# Patient Record
Sex: FEMALE | Race: NATIVE HAWAIIAN OR OTHER PACIFIC ISLANDER
[De-identification: names, ages, dates, MRNs, and addresses within clinical notes are randomized per-mention and may not be internally consistent; named-entity substitution may affect disease eponyms.]

---

## 2017-03-08 ENCOUNTER — HOSPITAL ENCOUNTER (OUTPATIENT)
Dept: HOSPITAL 47 - RADXRMAIN | Age: 37
Discharge: HOME | End: 2017-03-08
Attending: FAMILY MEDICINE
Payer: COMMERCIAL

## 2017-03-08 DIAGNOSIS — M54.2: Primary | ICD-10-CM

## 2017-03-08 PROCEDURE — 72050 X-RAY EXAM NECK SPINE 4/5VWS: CPT

## 2017-03-08 NOTE — XR
EXAMINATION TYPE: XR cervical spine comp

 

DATE OF EXAM: 3/8/2017 2:40 PM

 

COMPARISON: NONE

 

HISTORY: Cervicalgia

 

TECHNIQUE: 5 view cervical spine

 

FINDINGS: No acute fracture evident. Alignment is normal. Prevertebral space is normal. Posterior spi
nal lamellar line is intact. Disc height and vertebral body heights are preserved. Foramen are patent
. Prominent C7 transverse processes are present.

 

IMPRESSION:

1.  No acute osseous abnormality cervical spine

## 2017-03-10 ENCOUNTER — HOSPITAL ENCOUNTER (OUTPATIENT)
Dept: HOSPITAL 47 - LABWHC1 | Age: 37
Discharge: HOME | End: 2017-03-10
Payer: COMMERCIAL

## 2017-03-10 DIAGNOSIS — E03.9: ICD-10-CM

## 2017-03-10 DIAGNOSIS — D35.2: Primary | ICD-10-CM

## 2017-03-10 PROCEDURE — 36415 COLL VENOUS BLD VENIPUNCTURE: CPT

## 2017-03-10 PROCEDURE — 84146 ASSAY OF PROLACTIN: CPT

## 2017-03-10 PROCEDURE — 84443 ASSAY THYROID STIM HORMONE: CPT

## 2018-02-19 ENCOUNTER — HOSPITAL ENCOUNTER (OUTPATIENT)
Dept: HOSPITAL 47 - RADUSWWP | Age: 38
Discharge: HOME | End: 2018-02-19
Payer: COMMERCIAL

## 2018-02-19 DIAGNOSIS — N88.8: Primary | ICD-10-CM

## 2018-02-19 DIAGNOSIS — N92.1: ICD-10-CM

## 2018-02-19 PROCEDURE — 76830 TRANSVAGINAL US NON-OB: CPT

## 2018-02-19 PROCEDURE — 76856 US EXAM PELVIC COMPLETE: CPT

## 2018-02-19 NOTE — US
EXAMINATION TYPE: US pelvis complete transvag

 

DATE OF EXAM: 2/19/2018

 

COMPARISON: 12/2/2016

 

CLINICAL HISTORY: 37-year-old female Pelvic R10.2, N92.1 Frequent menstruation.

 

TECHNIQUE:  Transvaginal (TV) and Transabdominal (TA)  

Transvaginal scanning was medically necessary to visualize the left ovary.

 

Date of LMP:  1/26/18

 

Findings:

 

Uterus:  Anteverted measuring 7.4 x 3.7 x 4.8 cm. Numerous cervical nabothian cysts are present.

 

Endometrial Stripe: 1.1 cm, slightly heterogeneous.

 

Right Ovary:  2.6 x 1.4 x 2.9 cm with follicular change.

 

Left Ovary: not visualized on either transabdominal or transvaginal scanning due to adjacent bowel ga
s.

 

No evident adnexal abnormality or cul-de-sac free fluid.

 

 

 

IMPRESSION: 

1. Numerous cervical nabothian cysts incidentally noted.

2. Endometrial stripe measuring 1.1 cm thick should correspond to the secretory phase of the menstrua
l cycle.

3. Left ovary could not be visualized by either transabdominal or transvaginal scanning.

## 2019-01-21 ENCOUNTER — HOSPITAL ENCOUNTER (OUTPATIENT)
Dept: HOSPITAL 47 - ORWHC2ENDO | Age: 39
Discharge: HOME | End: 2019-01-21
Payer: COMMERCIAL

## 2019-01-21 VITALS — HEART RATE: 69 BPM | DIASTOLIC BLOOD PRESSURE: 69 MMHG | RESPIRATION RATE: 16 BRPM | SYSTOLIC BLOOD PRESSURE: 121 MMHG

## 2019-01-21 VITALS — TEMPERATURE: 97.6 F

## 2019-01-21 VITALS — BODY MASS INDEX: 28.3 KG/M2

## 2019-01-21 DIAGNOSIS — K21.9: ICD-10-CM

## 2019-01-21 DIAGNOSIS — K44.9: ICD-10-CM

## 2019-01-21 DIAGNOSIS — R13.10: ICD-10-CM

## 2019-01-21 DIAGNOSIS — Z79.890: ICD-10-CM

## 2019-01-21 DIAGNOSIS — K29.50: Primary | ICD-10-CM

## 2019-01-21 DIAGNOSIS — E07.9: ICD-10-CM

## 2019-01-21 DIAGNOSIS — Z91.041: ICD-10-CM

## 2019-01-21 DIAGNOSIS — Z79.899: ICD-10-CM

## 2019-01-21 DIAGNOSIS — Z79.84: ICD-10-CM

## 2019-01-21 LAB — GLUCOSE BLD-MCNC: 81 MG/DL (ref 75–99)

## 2019-01-21 PROCEDURE — 43239 EGD BIOPSY SINGLE/MULTIPLE: CPT

## 2019-01-21 PROCEDURE — 88305 TISSUE EXAM BY PATHOLOGIST: CPT

## 2019-01-21 NOTE — P.PCN
Date of Procedure: 01/21/19


Procedure(s) Performed: 


Procedure: Esophagogastroduodenoscopy and biopsy.





Preoperative diagnosis: Epigastric pain and dysphagia.





Postoperative diagnosis: 1. Small hiatal hernia with no obvious esophagitis or 

complicated reflux disease.  2. Mild antral gastritis.  3. Biopsies obtained 

from the duodenum, antrum and esophagus were





Preparation and sedation: Was provided by anesthesia.





Brief clinical history: The patient is a 38-year-old female who was evaluated 

last month in the office for complaints of sharp epigastric pain and trouble 

swallowing.  Apparently, this has come and gone over the years.  She had hiatal 

hernia surgery in the past and did feel better for couple years but now her 

pains has returned.  She had the gallbladder removed as well as the past.  Her 

last EGD was around 4 years ago.





Procedure: With the patient on her left lateral decubitus position and after 

informed consent and adequate sedation, I passed the Olympus-GIF H1 90 video 

upper endoscope through the cricopharyngeus down the esophagus.  GE junction 

was around 31 cm from the incisors and there was a small sliding hiatal hernia 

with no obvious esophagitis or complicated reflux disease.  The endoscope was 

then passed into the stomach which was insufflated with air and inspected in 

detail including the retroflex view in the cardia.  There was evidence of prior 

fundoplication.  There was also mottling and erythema in the antrum but no 

ulcers.  Pyloric channel did not show any ulcers.  Duodenal bulb, post bulbar 

area and descending duodenum appeared within normal limits.  I obtained 

biopsies from the duodenum, antrum and esophagus then the endoscope was 

withdrawn.





The patient tolerated the procedure well.





Plan: The patient was reassured.  Will await biopsy results and make further 

plans based on her course and biopsy results.  She will follow-up with you as 

planned and I will be happy to see in the office if her symptoms persist or 

recur.

## 2019-04-03 ENCOUNTER — HOSPITAL ENCOUNTER (OUTPATIENT)
Dept: HOSPITAL 47 - RADUSWWP | Age: 39
Discharge: HOME | End: 2019-04-03
Attending: FAMILY MEDICINE
Payer: COMMERCIAL

## 2019-04-03 DIAGNOSIS — R10.2: ICD-10-CM

## 2019-04-03 DIAGNOSIS — R93.89: Primary | ICD-10-CM

## 2019-04-03 PROCEDURE — 76801 OB US < 14 WKS SINGLE FETUS: CPT

## 2019-04-03 PROCEDURE — 76817 TRANSVAGINAL US OBSTETRIC: CPT

## 2019-04-03 NOTE — US
EXAMINATION TYPE: Transabdominal

 

DATE OF EXAM: 4/3/2019 10:56 AM

 

COMPARISON: NONE

 

CLINICAL HISTORY: R10.2 PELVIC AND PERINEAL PAIN. Early OB, fertility issues, A1

 

EXAM PERFORMED:  OBTA, OBTV

 

EXAM MEASUREMENTS:

 

GESTATIONAL AGE / DATING

 

Physician Established: Not yet established 

Dates by LMP:  LMP unknown 

Dates by First Scan:  No previous this is first scan 

Dates by Current Scan for: No IUP seen at this time   

 

 

MATERNAL ANATOMY 

 

Uterus: 9.3 x 6.3 x 5.4cm

Right Ovary: 2.3 x 1.9 x 1.7cm

Left Ovary: 3.8 x 3.5 x 3.1cm

Post CDS / Adnexa: wnl

Presence of free fluid: no

Presence of corpus luteal cyst: possible on the left = 3.1c,

Presence of subchorionic bleed: no

 

GESTATION / FETAL SURVEY

 

 

IUP:  No IUP seen at this time

Endometrium = 2.7cm , thickened with an irregular, mixed lesion, noted centrally,  measuring 1.1cm, u
nsure if abnormal gestational sac versus other etiology

 

Date of LMP: unknown

Beta HcG (if available):  not available

 

 

 

 

 

IMPRESSION:

Endometrium is markedly thickened at 2.7 cm appears somewhat irregular with a mixed lesion noted cent
rally measuring 1.1 cm. This is of uncertain etiology. Missed , abnormal gestational sac, or 
normal pregnancy too early to detect in the differential diagnosis. Ectopic pregnancy not excluded. R
ecommend follow-up serial beta hCG and pelvic ultrasound as clinically warranted.

## 2019-04-10 ENCOUNTER — HOSPITAL ENCOUNTER (OUTPATIENT)
Dept: HOSPITAL 47 - RADUSWWP | Age: 39
Discharge: HOME | End: 2019-04-10
Attending: FAMILY MEDICINE
Payer: COMMERCIAL

## 2019-04-10 DIAGNOSIS — Z3A.00: ICD-10-CM

## 2019-04-10 DIAGNOSIS — R10.2: ICD-10-CM

## 2019-04-10 DIAGNOSIS — O99.89: Primary | ICD-10-CM

## 2019-04-10 PROCEDURE — 76801 OB US < 14 WKS SINGLE FETUS: CPT

## 2019-04-10 PROCEDURE — 76817 TRANSVAGINAL US OBSTETRIC: CPT

## 2019-04-10 NOTE — US
EXAMINATION TYPE: Transabdominal

 

DATE OF EXAM: 4/10/2019 1:34 PM

 

COMPARISON: US 4/3/19.

 

CLINICAL HISTORY: Z33.1 Pregnant state, incidental,R10.2 PELVIC PAIN. Pelvic pain x 1 month.  Hx ovar
diya cyst.  Patient unsure of LMP.

 

EXAM PERFORMED:  Transvaginal (TV) and Transabdominal (TA)

 

EXAM MEASUREMENTS:

 

GESTATIONAL AGE / DATING

 

Physician Established: Not yet established. 

Dates by LMP:  Pt unsure of LMP 

Dates by First Scan:  No IUP seen 4/3/19. 

 

 

MATERNAL ANATOMY 

 

Uterus: 9.8 x 7.2 x 5.9 cm. Hypoechoic area seen: 0.6 x 0.7 x 0.5 cm.

Right Ovary: 3.4 x 1.8 x 1.4 cm

Left Ovary: 3.9 x 2.3 x 2.9 cm

Post CDS / Adnexa: wnl

Presence of free fluid: No

Presence of corpus luteal cyst: left ovary area of mixed echogenicity and peripheral vascularity seen
: 3.2 x 1.7 x 2.6 cm.  

Presence of subchorionic bleed: No evidence.

 

GESTATION / FETAL SURVEY

 

CRL: Not seen. 

MSD: 0.94 cm.  Too early to calculate dates. 

Yolk Sac (normal less than 6mm): 0.19

IUP:  No fetal pole seen at this time

 

Date of LMP: Unknown

 

 

IMPRESSION: Small intramural endometrial fluid collection containing a yolk sac that may represent ea
rly intrauterine pregnancy or anembryonic pregnancy/blighted ovum. Follow-up pelvic ultrasound in 7-1
0 days is recommended as well as repeat short-term serial serum beta hCG.

## 2019-04-23 ENCOUNTER — HOSPITAL ENCOUNTER (OUTPATIENT)
Dept: HOSPITAL 47 - RADUSWWP | Age: 39
Discharge: HOME | End: 2019-04-23
Attending: FAMILY MEDICINE
Payer: COMMERCIAL

## 2019-04-23 DIAGNOSIS — Z3A.01: ICD-10-CM

## 2019-04-23 DIAGNOSIS — Z34.91: Primary | ICD-10-CM

## 2019-04-23 PROCEDURE — 76801 OB US < 14 WKS SINGLE FETUS: CPT

## 2019-04-23 NOTE — US
EXAMINATION TYPE: Transabdominal

 

DATE OF EXAM: 4/23/2019 2:00 PM

 

COMPARISON: US 2019

 

CLINICAL HISTORY: Z33.1 Pregnant stated, incidental. Pelvic pain

 

EXAM PERFORMED:  Transabdominal (TA)

 

EXAM MEASUREMENTS:

 

GESTATIONAL AGE / DATING

 

Physician Established: Not established yet 

Dates by LMP:  Patient unsure 

Dates by First Scan:  No IUP seen on 1st scan 

Dates by Current Scan for: (7 weeks/1 days)  

** EDC: 12/09/2019

 

MATERNAL ANATOMY 

 

Uterus: 10.4 x 6.5 x 8.0cm, anteverted

Right Ovary: 2.8 x 1.4 x 1.8cm

Left Ovary: 4.0 x 2.1 x 2.8cm

Post CDS / Adnexa: wnl

Presence of free fluid: no

Presence of corpus luteal cyst: left ovary: 2.4 x 1.6 x 1.9cm 

Presence of subchorionic bleed: no

 

GESTATION / FETAL SURVEY

 

CRL: 10.3cm (7 weeks/1 days)

Yolk Sac (normal less than 6mm): 4.3mm

Heart Rate: 149 bpm

Rhythm:  Normal

IUP:  Viable IUP

 

Date of LMP: Patient unsure

Beta HcG (if available):  Not available at time of exam

 

 

 

 

 

IMPRESSION:

Viable single IUP measuring 7 weeks 1 day with a heart rate of 149bpm and estimated delivery date of 
12/09/2019.

## 2020-04-26 ENCOUNTER — HOSPITAL ENCOUNTER (EMERGENCY)
Dept: HOSPITAL 47 - EC | Age: 40
LOS: 1 days | Discharge: HOME | End: 2020-04-27
Payer: COMMERCIAL

## 2020-04-26 DIAGNOSIS — Z79.84: ICD-10-CM

## 2020-04-26 DIAGNOSIS — Z79.890: ICD-10-CM

## 2020-04-26 DIAGNOSIS — Z79.899: ICD-10-CM

## 2020-04-26 DIAGNOSIS — N83.202: ICD-10-CM

## 2020-04-26 DIAGNOSIS — Z90.49: ICD-10-CM

## 2020-04-26 DIAGNOSIS — E07.9: ICD-10-CM

## 2020-04-26 DIAGNOSIS — Z88.8: ICD-10-CM

## 2020-04-26 DIAGNOSIS — N12: Primary | ICD-10-CM

## 2020-04-26 LAB
ALBUMIN SERPL-MCNC: 4.2 G/DL (ref 3.5–5)
ALP SERPL-CCNC: 81 U/L (ref 38–126)
ALT SERPL-CCNC: 53 U/L (ref 4–34)
AMYLASE SERPL-CCNC: 33 U/L (ref 30–110)
ANION GAP SERPL CALC-SCNC: 9 MMOL/L
AST SERPL-CCNC: 39 U/L (ref 14–36)
BASOPHILS # BLD AUTO: 0.1 K/UL (ref 0–0.2)
BASOPHILS NFR BLD AUTO: 0 %
BUN SERPL-SCNC: 8 MG/DL (ref 7–17)
CALCIUM SPEC-MCNC: 9.4 MG/DL (ref 8.4–10.2)
CHLORIDE SERPL-SCNC: 104 MMOL/L (ref 98–107)
CO2 SERPL-SCNC: 21 MMOL/L (ref 22–30)
EOSINOPHIL # BLD AUTO: 0.2 K/UL (ref 0–0.7)
EOSINOPHIL NFR BLD AUTO: 1 %
ERYTHROCYTE [DISTWIDTH] IN BLOOD BY AUTOMATED COUNT: 5.2 M/UL (ref 3.8–5.4)
ERYTHROCYTE [DISTWIDTH] IN BLOOD: 13.1 % (ref 11.5–15.5)
GLUCOSE SERPL-MCNC: 148 MG/DL (ref 74–99)
HCT VFR BLD AUTO: 48.4 % (ref 34–46)
HGB BLD-MCNC: 16.2 GM/DL (ref 11.4–16)
LYMPHOCYTES # SPEC AUTO: 1.5 K/UL (ref 1–4.8)
LYMPHOCYTES NFR SPEC AUTO: 10 %
MCH RBC QN AUTO: 31.2 PG (ref 25–35)
MCHC RBC AUTO-ENTMCNC: 33.5 G/DL (ref 31–37)
MCV RBC AUTO: 93 FL (ref 80–100)
MONOCYTES # BLD AUTO: 0.8 K/UL (ref 0–1)
MONOCYTES NFR BLD AUTO: 5 %
NEUTROPHILS # BLD AUTO: 12.9 K/UL (ref 1.3–7.7)
NEUTROPHILS NFR BLD AUTO: 82 %
PH UR: 6 [PH] (ref 5–8)
PLATELET # BLD AUTO: 201 K/UL (ref 150–450)
POTASSIUM SERPL-SCNC: 3.9 MMOL/L (ref 3.5–5.1)
PROT SERPL-MCNC: 7.4 G/DL (ref 6.3–8.2)
RBC UR QL: 2 /HPF (ref 0–5)
SODIUM SERPL-SCNC: 134 MMOL/L (ref 137–145)
SP GR UR: 1 (ref 1–1.03)
SQUAMOUS UR QL AUTO: 4 /HPF (ref 0–4)
UROBILINOGEN UR QL STRIP: <2 MG/DL (ref ?–2)
WBC # BLD AUTO: 15.8 K/UL (ref 3.8–10.6)
WBC # UR AUTO: 7 /HPF (ref 0–5)

## 2020-04-26 PROCEDURE — 96375 TX/PRO/DX INJ NEW DRUG ADDON: CPT

## 2020-04-26 PROCEDURE — 93975 VASCULAR STUDY: CPT

## 2020-04-26 PROCEDURE — 87635 SARS-COV-2 COVID-19 AMP PRB: CPT

## 2020-04-26 PROCEDURE — 99284 EMERGENCY DEPT VISIT MOD MDM: CPT

## 2020-04-26 PROCEDURE — 87040 BLOOD CULTURE FOR BACTERIA: CPT

## 2020-04-26 PROCEDURE — 96376 TX/PRO/DX INJ SAME DRUG ADON: CPT

## 2020-04-26 PROCEDURE — 76830 TRANSVAGINAL US NON-OB: CPT

## 2020-04-26 PROCEDURE — 96374 THER/PROPH/DIAG INJ IV PUSH: CPT

## 2020-04-26 PROCEDURE — 87086 URINE CULTURE/COLONY COUNT: CPT

## 2020-04-26 PROCEDURE — 80053 COMPREHEN METABOLIC PANEL: CPT

## 2020-04-26 PROCEDURE — 83690 ASSAY OF LIPASE: CPT

## 2020-04-26 PROCEDURE — 36415 COLL VENOUS BLD VENIPUNCTURE: CPT

## 2020-04-26 PROCEDURE — 74177 CT ABD & PELVIS W/CONTRAST: CPT

## 2020-04-26 PROCEDURE — 81025 URINE PREGNANCY TEST: CPT

## 2020-04-26 PROCEDURE — 83605 ASSAY OF LACTIC ACID: CPT

## 2020-04-26 PROCEDURE — 85025 COMPLETE CBC W/AUTO DIFF WBC: CPT

## 2020-04-26 PROCEDURE — 82150 ASSAY OF AMYLASE: CPT

## 2020-04-26 PROCEDURE — 81001 URINALYSIS AUTO W/SCOPE: CPT

## 2020-04-26 PROCEDURE — 71045 X-RAY EXAM CHEST 1 VIEW: CPT

## 2020-04-26 NOTE — XR
EXAMINATION TYPE: XR chest 1V portable

 

DATE OF EXAM: 4/26/2020

 

COMPARISON: None

 

INDICATION: Cough headache fever

 

TECHNIQUE: Single frontal view of the chest is obtained.

 

FINDINGS:  

The heart size is normal.  

The pulmonary vasculature is normal.  

The lungs are clear.  

No suspicious mild infiltrates are evident.

 

IMPRESSION:  

1. No acute pulmonary process.

## 2020-04-26 NOTE — ED
General Adult HPI





- General


Source: patient, family, RN notes reviewed


Mode of arrival: ambulatory


Limitations: language barrier





<Santos Alegria - Last Filed: 04/27/20 00:26>





<Dayan Connelly - Last Filed: 04/27/20 03:16>





- General


Chief complaint: Abdominal Pain


Stated complaint: Fever, headache, nausea


Time Seen by Provider: 04/26/20 19:07





- History of Present Illness


Initial comments: 





40-year-old female with a past medical history of GERD, migraines, thyroid 

disorder presents to the emergency department for several complaints.  Patient 

has her  translating for her.  Patient states about a week ago has 

started to have right flank pain radiating to the right abdomen with associated 

dysuria.  States that she thinks she could've a urinary tract infection.  

Patient has had these before.  Patient developed a fever last night according to

the  and she had a 102 fever today.  She has not received any Tylenol or 

Motrin today.  Patient is also complaining of headache associated with this.  

She denies cough or congestion.  Denies sore throat. Patient has no other 

complaints at this time including shortness of breath, chest pain,  headache, or

visual changes. (Santos Alegria)





- Related Data


                                Home Medications











 Medication  Instructions  Recorded  Confirmed


 


Levothyroxine Sodium [Synthroid] 75 mcg PO DAILY 01/03/19 01/18/19


 


Topiramate [Topiramate ER] 50 mg PO DAILY 01/03/19 01/18/19


 


metFORMIN HCL [Glucophage] 500 mg PO DAILY 01/03/19 01/18/19








                                  Previous Rx's











 Medication  Instructions  Recorded


 


Nitrofurantoin Monohyd/M-Cryst 100 mg PO Q12HR #14 cap 03/30/19





[Macrobid]  


 


Sulfamethox-Tmp 800-160Mg [Bactrim 1 tab PO Q12HR #28 tab 04/27/20





-160 mg]  











                                    Allergies











Allergy/AdvReac Type Severity Reaction Status Date / Time


 


gadobenate dimeglumine Allergy  Rash/Hives Verified 04/26/20 18:58





[From [facility]]     














Review of Systems


ROS Other: All systems not noted in ROS Statement are negative.





<Santos Alegria - Last Filed: 04/27/20 00:26>


ROS Other: All systems not noted in ROS Statement are negative.





<Dayan Connelly BETY - Last Filed: 04/27/20 03:16>


ROS Statement: 


Those systems with pertinent positive or pertinent negative responses have been 

documented in the HPI.








Past Medical History


Past Medical History: GERD/Reflux, Thyroid Disorder


Additional Past Medical History / Comment(s): takes metformin for PCOS, not 

diabetes, hx of migraines


History of Any Multi-Drug Resistant Organisms: ESBL


Date of last positivie culture/infection: 4/9/18


MDRO Source:: ESBL URINE


Past Surgical History: Cholecystectomy, Hernia Repair


Past Anesthesia/Blood Transfusion Reactions: Postoperative Nausea & Vomiting 

(PONV)


Past Psychological History: No Psychological Hx Reported


Smoking Status: Never smoker


Past Alcohol Use History: None Reported


Past Drug Use History: None Reported





- Past Family History


  ** Mother


Family Medical History: No Reported History





<Santos Alegria - Last Filed: 04/27/20 00:26>





General Exam


Limitations: language barrier


General appearance: alert, in no apparent distress


Head exam: Present: atraumatic, normocephalic, normal inspection


Eye exam: Present: normal appearance, PERRL, EOMI.  Absent: scleral icterus, 

conjunctival injection, periorbital swelling


ENT exam: Present: normal exam, mucous membranes moist


Neck exam: Present: normal inspection, full ROM.  Absent: tenderness, me

ningismus, lymphadenopathy


Respiratory exam: Present: normal lung sounds bilaterally.  Absent: respiratory 

distress, wheezes, rales, rhonchi, stridor


Cardiovascular Exam: Present: regular rate, normal rhythm, normal heart sounds. 

Absent: systolic murmur, diastolic murmur, rubs, gallop, clicks


GI/Abdominal exam: Present: soft, tenderness (RLQ tenderness), normal bowel 

sounds.  Absent: distended, guarding, rebound, rigid


Back exam: Present: CVA tenderness (R).  Absent: CVA tenderness (L)


Neurological exam: Present: alert





<Santos Alegria P - Last Filed: 04/27/20 00:26>





Course





<Santos Alegria - Last Filed: 04/27/20 00:26>





                                   Vital Signs











  04/26/20 04/26/20 04/26/20





  19:00 20:37 22:11


 


Temperature 98.4 F 98.8 F 100.4 F H


 


Pulse Rate 99 88 96


 


Respiratory 18 20 19





Rate   


 


Blood Pressure 115/71 102/69 106/80


 


O2 Sat by Pulse 98 100 96





Oximetry   














  04/26/20 04/27/20





  23:23 00:35


 


Temperature 98.6 F 98.1 F


 


Pulse Rate 86 71


 


Respiratory 16 18





Rate  


 


Blood Pressure 103/72 103/74


 


O2 Sat by Pulse 97 96





Oximetry  














- Reevaluation(s)


Reevaluation #1: 





04/26/20 22:17


Patient was reevaluated after CAT scan results and is still having considerable 

abdominal pain although it has improved.  She was given Tylenol another dose of 

pain medication.  I did recommend pelvic exam but  states that she was 

unable to tolerate this last time she was supposed to have one and he refuses to

have this done again.  Patient also refuses through his translation.  I did 

order a pelvic ultrasound given 4 cm cyst on the left ovary. (Santos Alegria)





Medical Decision Making





- Lab Data


Result diagrams: 


                                 04/26/20 19:42





                                 04/26/20 19:42





<Santos Alegria - Last Filed: 04/27/20 00:26>





- Lab Data


Result diagrams: 


                                 04/26/20 19:42





                                 04/26/20 19:42





<Dayan Connelly - Last Filed: 04/27/20 03:16>





- Medical Decision Making





Vitals are stable upon arrival.  Patient is afebrile.  She'll exam does reveal 

some right lower quadrant tenderness with right CVA tenderness.  CBC does show a

leukocytosis with a left shift.  CMP generally unremarkable.  Urinalysis does 

show many bacteria with 7 white blood cells and small leukocyte esterase.  

Coronavirus negative.  Chest x-ray shows no acute.  CT abdomen and pelvis was 

obtained.  There was a normal appendix however there is a 4.0 cm left ovarian 

cyst.  Given patient's pain ultrasound was performed today for torsion.  There 

is no evidence of torsion.  There is a large cyst on the ovary that could become

too beating to patient's pain.  Patient was given analgesics.  She did have some

moderate improvement in pain.  I discussed with the patient and  may have

a pyelonephritis given clinical symptoms and many bacteria.  She was given 

Rocephin.  I reviewed patient's previous urine cultures and she does have 

history of ESBL urine with sensitivity to Bactrim in the past.  I also discussed

inpatient versus outpatient management with patient and .  They prefer to

be discharged home and to follow-up with primary care.  However they are 

agreeable to returning here if she has any worsening symptoms. (Santos Alegria)


I was available for consultation in the emergency department.  The history and 

physical exam were done by the midlevel provider. I was consulted for this 

patients care. I reviewed the case with the midlevel provider and based on 

their presentation of the patient, I agree with the assessment, medical decision

making and plan of care as documented.





Chart was dictated using Dragon dictation software.  Attempts were made to 

correct any dictation errors however some typographical errors may persist.





Patient was seen during the national state of emergency due to the covid-19 

pandemic.  (Dayan Connelly)





- Lab Data





                                   Lab Results











  04/26/20 04/26/20 04/26/20 Range/Units





  19:14 19:42 19:42 


 


WBC   15.8 H   (3.8-10.6)  k/uL


 


RBC   5.20   (3.80-5.40)  m/uL


 


Hgb   16.2 H   (11.4-16.0)  gm/dL


 


Hct   48.4 H   (34.0-46.0)  %


 


MCV   93.0   (80.0-100.0)  fL


 


MCH   31.2   (25.0-35.0)  pg


 


MCHC   33.5   (31.0-37.0)  g/dL


 


RDW   13.1   (11.5-15.5)  %


 


Plt Count   201   (150-450)  k/uL


 


Neutrophils %   82   %


 


Lymphocytes %   10   %


 


Monocytes %   5   %


 


Eosinophils %   1   %


 


Basophils %   0   %


 


Neutrophils #   12.9 H   (1.3-7.7)  k/uL


 


Lymphocytes #   1.5   (1.0-4.8)  k/uL


 


Monocytes #   0.8   (0-1.0)  k/uL


 


Eosinophils #   0.2   (0-0.7)  k/uL


 


Basophils #   0.1   (0-0.2)  k/uL


 


Sodium     (137-145)  mmol/L


 


Potassium     (3.5-5.1)  mmol/L


 


Chloride     ()  mmol/L


 


Carbon Dioxide     (22-30)  mmol/L


 


Anion Gap     mmol/L


 


BUN     (7-17)  mg/dL


 


Creatinine     (0.52-1.04)  mg/dL


 


Est GFR (CKD-EPI)AfAm     (>60 ml/min/1.73 sqM)  


 


Est GFR (CKD-EPI)NonAf     (>60 ml/min/1.73 sqM)  


 


Glucose     (74-99)  mg/dL


 


Plasma Lactic Acid Sawyer     (0.7-2.0)  mmol/L


 


Calcium     (8.4-10.2)  mg/dL


 


Total Bilirubin     (0.2-1.3)  mg/dL


 


AST     (14-36)  U/L


 


ALT     (4-34)  U/L


 


Alkaline Phosphatase     ()  U/L


 


Total Protein     (6.3-8.2)  g/dL


 


Albumin     (3.5-5.0)  g/dL


 


Amylase     ()  U/L


 


Lipase     ()  U/L


 


Urine Color    Light Yellow  


 


Urine Appearance    Clear  (Clear)  


 


Urine pH    6.0  (5.0-8.0)  


 


Ur Specific Gravity    1.004  (1.001-1.035)  


 


Urine Protein    Negative  (Negative)  


 


Urine Glucose (UA)    Negative  (Negative)  


 


Urine Ketones    Negative  (Negative)  


 


Urine Blood    Moderate H  (Negative)  


 


Urine Nitrite    Negative  (Negative)  


 


Urine Bilirubin    Negative  (Negative)  


 


Urine Urobilinogen    <2.0  (<2.0)  mg/dL


 


Ur Leukocyte Esterase    Small H  (Negative)  


 


Urine RBC    2  (0-5)  /hpf


 


Urine WBC    7 H  (0-5)  /hpf


 


Ur Squamous Epith Cells    4  (0-4)  /hpf


 


Amorphous Sediment    Rare H  (None)  /hpf


 


Urine Bacteria    Many H  (None)  /hpf


 


Urine HCG, Qual  Not Detected    (Not Detectd)  


 


Coronavirus (PCR)     (Not Detectd)  














  04/26/20 04/26/20 04/26/20 Range/Units





  19:42 19:42 19:42 


 


WBC     (3.8-10.6)  k/uL


 


RBC     (3.80-5.40)  m/uL


 


Hgb     (11.4-16.0)  gm/dL


 


Hct     (34.0-46.0)  %


 


MCV     (80.0-100.0)  fL


 


MCH     (25.0-35.0)  pg


 


MCHC     (31.0-37.0)  g/dL


 


RDW     (11.5-15.5)  %


 


Plt Count     (150-450)  k/uL


 


Neutrophils %     %


 


Lymphocytes %     %


 


Monocytes %     %


 


Eosinophils %     %


 


Basophils %     %


 


Neutrophils #     (1.3-7.7)  k/uL


 


Lymphocytes #     (1.0-4.8)  k/uL


 


Monocytes #     (0-1.0)  k/uL


 


Eosinophils #     (0-0.7)  k/uL


 


Basophils #     (0-0.2)  k/uL


 


Sodium  134 L    (137-145)  mmol/L


 


Potassium  3.9    (3.5-5.1)  mmol/L


 


Chloride  104    ()  mmol/L


 


Carbon Dioxide  21 L    (22-30)  mmol/L


 


Anion Gap  9    mmol/L


 


BUN  8    (7-17)  mg/dL


 


Creatinine  0.48 L    (0.52-1.04)  mg/dL


 


Est GFR (CKD-EPI)AfAm  >90    (>60 ml/min/1.73 sqM)  


 


Est GFR (CKD-EPI)NonAf  >90    (>60 ml/min/1.73 sqM)  


 


Glucose  148 H    (74-99)  mg/dL


 


Plasma Lactic Acid Sawyer   2.0   (0.7-2.0)  mmol/L


 


Calcium  9.4    (8.4-10.2)  mg/dL


 


Total Bilirubin  0.6    (0.2-1.3)  mg/dL


 


AST  39 H    (14-36)  U/L


 


ALT  53 H    (4-34)  U/L


 


Alkaline Phosphatase  81    ()  U/L


 


Total Protein  7.4    (6.3-8.2)  g/dL


 


Albumin  4.2    (3.5-5.0)  g/dL


 


Amylase  33    ()  U/L


 


Lipase  24    ()  U/L


 


Urine Color     


 


Urine Appearance     (Clear)  


 


Urine pH     (5.0-8.0)  


 


Ur Specific Gravity     (1.001-1.035)  


 


Urine Protein     (Negative)  


 


Urine Glucose (UA)     (Negative)  


 


Urine Ketones     (Negative)  


 


Urine Blood     (Negative)  


 


Urine Nitrite     (Negative)  


 


Urine Bilirubin     (Negative)  


 


Urine Urobilinogen     (<2.0)  mg/dL


 


Ur Leukocyte Esterase     (Negative)  


 


Urine RBC     (0-5)  /hpf


 


Urine WBC     (0-5)  /hpf


 


Ur Squamous Epith Cells     (0-4)  /hpf


 


Amorphous Sediment     (None)  /hpf


 


Urine Bacteria     (None)  /hpf


 


Urine HCG, Qual     (Not Detectd)  


 


Coronavirus (PCR)    Not Detected  (Not Detectd)  














Disposition


Is patient prescribed a controlled substance at d/c from ED?: No


Time of Disposition: 00:24





<Airam,Santos P - Last Filed: 04/27/20 00:26>





<Dayan Connelly - Last Filed: 04/27/20 03:16>


Clinical Impression: 


 Pyelonephritis, Ovarian cyst





Disposition: HOME SELF-CARE


Condition: Good


Instructions (If sedation given, give patient instructions):  Kidney Infection 

(ED)


Additional Instructions: 


Please take Tylenol 3 for pain.  Take Bactrim for urinary tract infection.  

Follow-up with primary care in 1-2 days.  Return to the emergency department if 

you have any worsening symptoms.


Prescriptions: 


Sulfamethox-Tmp 800-160Mg [Bactrim -160 mg] 1 tab PO Q12HR #28 tab


Referrals: 


Radha Shen DO [Primary Care Provider] - 1-2 days

## 2020-04-26 NOTE — US
EXAMINATION TYPE: US transvaginal

 

DATE OF EXAM: 4/26/2020

 

COMPARISON: NONE

 

CLINICAL HISTORY: ovarian cyst. left ovarian cyst 

 

TECHNIQUE:  Transvaginal (TV).  

 

Date of LMP:  04/09/20

 

EXAM MEASUREMENTS:

 

Uterus:  10.1 x 4.7 x 5.0 cm

Endometrial Stripe: 1.7 cm

Right Ovary:  3.1 x 1.6 x 1.7 cm

Left Ovary:  4.3 x 3.2 x 3.9 cm

 

 

 

1. Uterus:  Anteverted   Nabothian cysts

2. Endometrium:  appears thickened 

3. Right Ovary:  appears wnl, difficult to Doppler due to location - posterior to uterus

4. Left Ovary:  cystic area = 3.6 x 3.0 x 2.9cm 

**Spectral, color and waveform doppler imaging shows good arterial and venous flow within the ovaries
; there is no evidence for ovarian torsion.

5. Bilateral Adnexa:  appears wnl

6. Posterior cul-de-sac:  wnl

 

 

 

IMPRESSION:

Normal endometrium. Multiple cervical cysts. Dominant cyst on the left ovary measures 3.6 x 3 cm. No 
evidence of ovarian torsion.

## 2020-04-26 NOTE — CT
EXAMINATION TYPE: CT abdomen pelvis w con

 

DATE OF EXAM: 4/26/2020

 

COMPARISON: None

 

INDICATION: RLQ pain, nausea, fever.

 

DLP: 1031.7 mGycm, Automated exposure control for dose reduction was used.

 

CONTRAST:  100 mL of Isovue 300. 

                        Study performed without Oral Contrast

 

TECHNIQUE: Axial images were obtained from above the diaphragm to the pubic rami in the axial plane a
t 5 mm thick sections.  Reconstructed images are reviewed on the computer in the coronal plane.  

 

FINDINGS:

 

Limited CT sections are obtained the lung bases.  The lung bases are clear.

 

CT ABDOMEN:

 

Liver: Normal

 

Spleen: Normal

 

Pancreas: Normal

 

Adrenal glands: The adrenal glands are normal.

 

Gallbladder: Surgically absent  

 

Kidneys: No masses are evident. No hydronephrosis is present.   No cysts are present.  Delayed images
 were obtained through the kidneys, which remain unremarkable.

 

Aorta: Normal

 

Inferior vena cava: Normal.

 

CT PELVIS: 

Loops of bowel within the abdomen and pelvis are normal.     There are loops of bowel which are incom
pletely distended or lack oral contrast limiting their evaluation.

 

Appendix: Normal as visualized. No dilated tubular structure inflammatory changes are evident.

 

Urinary bladder: Normal. 

 

Genitourinary structures: Uterus is normal. There is a 4.0 cm left ovarian cyst measuring 18 Hounsfie
ld units. Right adnexa is normal. No free fluid is within the pelvis.

 

Osseous structures: No suspicious lytic or sclerotic lesions.

 

IMPRESSIONS:

1.  4.0 cm left ovarian cyst.

2. Normal appendix

## 2020-04-27 VITALS
DIASTOLIC BLOOD PRESSURE: 74 MMHG | SYSTOLIC BLOOD PRESSURE: 103 MMHG | TEMPERATURE: 98.1 F | HEART RATE: 71 BPM | RESPIRATION RATE: 18 BRPM

## 2020-05-03 ENCOUNTER — HOSPITAL ENCOUNTER (INPATIENT)
Dept: HOSPITAL 47 - EC | Age: 40
LOS: 2 days | Discharge: HOME | DRG: 690 | End: 2020-05-05
Attending: HOSPITALIST | Admitting: HOSPITALIST
Payer: COMMERCIAL

## 2020-05-03 DIAGNOSIS — Z79.890: ICD-10-CM

## 2020-05-03 DIAGNOSIS — R31.9: ICD-10-CM

## 2020-05-03 DIAGNOSIS — B96.20: ICD-10-CM

## 2020-05-03 DIAGNOSIS — N83.202: ICD-10-CM

## 2020-05-03 DIAGNOSIS — E28.2: ICD-10-CM

## 2020-05-03 DIAGNOSIS — Z86.19: ICD-10-CM

## 2020-05-03 DIAGNOSIS — Z79.899: ICD-10-CM

## 2020-05-03 DIAGNOSIS — Z79.84: ICD-10-CM

## 2020-05-03 DIAGNOSIS — Z88.8: ICD-10-CM

## 2020-05-03 DIAGNOSIS — E03.9: ICD-10-CM

## 2020-05-03 DIAGNOSIS — Z98.891: ICD-10-CM

## 2020-05-03 DIAGNOSIS — Z90.49: ICD-10-CM

## 2020-05-03 DIAGNOSIS — Z11.59: ICD-10-CM

## 2020-05-03 DIAGNOSIS — N13.6: Primary | ICD-10-CM

## 2020-05-03 DIAGNOSIS — Z98.890: ICD-10-CM

## 2020-05-03 DIAGNOSIS — Z86.69: ICD-10-CM

## 2020-05-03 DIAGNOSIS — E66.9: ICD-10-CM

## 2020-05-03 DIAGNOSIS — K21.9: ICD-10-CM

## 2020-05-03 LAB
ALBUMIN SERPL-MCNC: 4.3 G/DL (ref 3.5–5)
ALP SERPL-CCNC: 95 U/L (ref 38–126)
ALT SERPL-CCNC: 71 U/L (ref 4–34)
AMYLASE SERPL-CCNC: 81 U/L (ref 30–110)
ANION GAP SERPL CALC-SCNC: 8 MMOL/L
AST SERPL-CCNC: 25 U/L (ref 14–36)
BASOPHILS # BLD AUTO: 0 K/UL (ref 0–0.2)
BASOPHILS NFR BLD AUTO: 0 %
BUN SERPL-SCNC: 16 MG/DL (ref 7–17)
CALCIUM SPEC-MCNC: 9.9 MG/DL (ref 8.4–10.2)
CHLORIDE SERPL-SCNC: 103 MMOL/L (ref 98–107)
CO2 SERPL-SCNC: 26 MMOL/L (ref 22–30)
EOSINOPHIL # BLD AUTO: 0.2 K/UL (ref 0–0.7)
EOSINOPHIL NFR BLD AUTO: 1 %
ERYTHROCYTE [DISTWIDTH] IN BLOOD BY AUTOMATED COUNT: 5.02 M/UL (ref 3.8–5.4)
ERYTHROCYTE [DISTWIDTH] IN BLOOD: 12.8 % (ref 11.5–15.5)
GLUCOSE SERPL-MCNC: 96 MG/DL (ref 74–99)
HCT VFR BLD AUTO: 48 % (ref 34–46)
HGB BLD-MCNC: 15.9 GM/DL (ref 11.4–16)
LYMPHOCYTES # SPEC AUTO: 2.2 K/UL (ref 1–4.8)
LYMPHOCYTES NFR SPEC AUTO: 15 %
MCH RBC QN AUTO: 31.7 PG (ref 25–35)
MCHC RBC AUTO-ENTMCNC: 33.2 G/DL (ref 31–37)
MCV RBC AUTO: 95.5 FL (ref 80–100)
MONOCYTES # BLD AUTO: 0.7 K/UL (ref 0–1)
MONOCYTES NFR BLD AUTO: 5 %
NEUTROPHILS # BLD AUTO: 11.2 K/UL (ref 1.3–7.7)
NEUTROPHILS NFR BLD AUTO: 77 %
PH UR: 6.5 [PH] (ref 5–8)
PLATELET # BLD AUTO: 459 K/UL (ref 150–450)
POTASSIUM SERPL-SCNC: 4.6 MMOL/L (ref 3.5–5.1)
PROT SERPL-MCNC: 7.5 G/DL (ref 6.3–8.2)
RBC UR QL: 1 /HPF (ref 0–5)
SODIUM SERPL-SCNC: 137 MMOL/L (ref 137–145)
SP GR UR: 1 (ref 1–1.03)
SQUAMOUS UR QL AUTO: 1 /HPF (ref 0–4)
UROBILINOGEN UR QL STRIP: <2 MG/DL (ref ?–2)
WBC # BLD AUTO: 14.5 K/UL (ref 3.8–10.6)
WBC # UR AUTO: <1 /HPF (ref 0–5)

## 2020-05-03 PROCEDURE — 99285 EMERGENCY DEPT VISIT HI MDM: CPT

## 2020-05-03 PROCEDURE — 80053 COMPREHEN METABOLIC PANEL: CPT

## 2020-05-03 PROCEDURE — 74177 CT ABD & PELVIS W/CONTRAST: CPT

## 2020-05-03 PROCEDURE — 96365 THER/PROPH/DIAG IV INF INIT: CPT

## 2020-05-03 PROCEDURE — 81001 URINALYSIS AUTO W/SCOPE: CPT

## 2020-05-03 PROCEDURE — 83605 ASSAY OF LACTIC ACID: CPT

## 2020-05-03 PROCEDURE — 82150 ASSAY OF AMYLASE: CPT

## 2020-05-03 PROCEDURE — 96361 HYDRATE IV INFUSION ADD-ON: CPT

## 2020-05-03 PROCEDURE — 87635 SARS-COV-2 COVID-19 AMP PRB: CPT

## 2020-05-03 PROCEDURE — 96375 TX/PRO/DX INJ NEW DRUG ADDON: CPT

## 2020-05-03 PROCEDURE — 83690 ASSAY OF LIPASE: CPT

## 2020-05-03 PROCEDURE — 85025 COMPLETE CBC W/AUTO DIFF WBC: CPT

## 2020-05-03 PROCEDURE — 36415 COLL VENOUS BLD VENIPUNCTURE: CPT

## 2020-05-03 NOTE — ED
General Adult HPI





- General


Chief complaint: Abdominal Pain


Stated complaint: Abd pain


Time Seen by Provider: 05/03/20 19:50


Source: patient, family, RN notes reviewed, old records reviewed


Mode of arrival: ambulatory


Limitations: no limitations





- History of Present Illness


Initial comments: 





This is a 40-year-old female presents emergency Department with a three-week 

history of right CVA tenderness which radiates around the front.  Patient has 

terrible English and her  is speaking for the most part.  He was in the 

emergency department at 26th of April she she got a CAT scan and an ultrasound 

did show a left-sided 4 cm ovarian cyst but she did not complain of pain on the 

left side.  There was no torsion.  CT did not find any findings at that time 

that explained her pain.  Patient states the pain initially bad she does vomit. 

Patient denies any diarrhea.  Patient states last time she was here she had a 

fever she does not have a fever currently.  Patient denies any chest pain 

difficulty breathing shortness of breath.





- Related Data


                                Home Medications











 Medication  Instructions  Recorded  Confirmed


 


Levothyroxine Sodium [Synthroid] 75 mcg PO DAILY 01/03/19 01/18/19


 


Topiramate [Topiramate ER] 50 mg PO DAILY 01/03/19 01/18/19


 


metFORMIN HCL [Glucophage] 500 mg PO DAILY 01/03/19 01/18/19








                                  Previous Rx's











 Medication  Instructions  Recorded


 


Nitrofurantoin Monohyd/M-Cryst 100 mg PO Q12HR #14 cap 03/30/19





[Macrobid]  


 


Sulfamethox-Tmp 800-160Mg [Bactrim 1 tab PO Q12HR #28 tab 04/27/20





-160 mg]  











                                    Allergies











Allergy/AdvReac Type Severity Reaction Status Date / Time


 


gadobenate dimeglumine Allergy  Rash/Hives Verified 04/26/20 18:58





[From [facility]]     














Review of Systems


ROS Statement: 


Those systems with pertinent positive or pertinent negative responses have been 

documented in the HPI.





ROS Other: All systems not noted in ROS Statement are negative.





Past Medical History


Past Medical History: GERD/Reflux, Thyroid Disorder


Additional Past Medical History / Comment(s): takes metformin for PCOS, not 

diabetes, hx of migraines


History of Any Multi-Drug Resistant Organisms: ESBL


Date of last positivie culture/infection: 4/9/18


MDRO Source:: ESBL URINE


Past Surgical History: Cholecystectomy, Hernia Repair


Past Anesthesia/Blood Transfusion Reactions: Postoperative Nausea & Vomiting 

(PONV)


Past Psychological History: No Psychological Hx Reported


Smoking Status: Never smoker


Past Alcohol Use History: None Reported


Past Drug Use History: None Reported





- Past Family History


  ** Mother


Family Medical History: No Reported History





General Exam





- General Exam Comments


Initial Comments: 





GENERAL:


Patient is well-developed and well-nourished.  Patient is nontoxic and well-

hydrated and is in mild distress.





ENT:


Neck is soft and supple.  No significant lymphadenopathy is noted.  Oropharynx 

is clear.  Moist mucous membranes.  Neck has full range of motion without 

eliciting any pain.  





EYES:


The sclera were anicteric and conjunctiva were pink and moist.  Extraocular 

movements were intact and pupils were equal round and reactive to light.  Ey

elids were unremarkable.





PULMONARY:


Unlabored respirations.  Good breath sounds bilaterally.  No audible rales 

rhonchi or wheezing was noted.





CARDIOVASCULAR:


There is a regular rate and rhythm without any murmurs gallops or rubs. 





ABDOMEN


Soft and nontender with normal bowel sounds.  





SKIN:


Skin is clear with no lesions or rashes and otherwise unremarkable.





NEUROLOGIC:


Patient is alert and oriented x3.  Cranial nerves II through XII are grossly 

intact.  Motor and sensory are also intact.  Normal speech, volume and content. 

Symmetrical smile. 





MUSCULOSKELETAL:


Normal extremities with adequate strength and full range of motion.  Patient has

mild right-sided CVA tenderness





LYMPHATICS:


No significant lymphadenopathy is noted





PSYCHIATRIC:


Normal psychiatric evaluation.  


Limitations: no limitations





Course


                                   Vital Signs











  05/03/20





  19:47


 


Temperature 97.4 F L


 


Pulse Rate 73


 


Respiratory 18





Rate 


 


Blood Pressure 135/82


 


O2 Sat by Pulse 100





Oximetry 














Medical Decision Making





- Medical Decision Making





I will back into room to reevaluate the patient she still is having CVA 

tenderness on the right and a little bit of right upper quadrant tenderness at 

this point time.  I spoke with Dr. Rinaldi he agreed to admit the patient admitted

the patient I consulted Dr. Ricketts I did start the patient 2 g of Rocephin.





- Lab Data


Result diagrams: 


                                 05/03/20 20:09





                                 05/03/20 20:09


                                   Lab Results











  05/03/20 05/03/20 05/03/20 Range/Units





  20:09 20:09 20:09 


 


WBC  14.5 H    (3.8-10.6)  k/uL


 


RBC  5.02    (3.80-5.40)  m/uL


 


Hgb  15.9    (11.4-16.0)  gm/dL


 


Hct  48.0 H    (34.0-46.0)  %


 


MCV  95.5    (80.0-100.0)  fL


 


MCH  31.7    (25.0-35.0)  pg


 


MCHC  33.2    (31.0-37.0)  g/dL


 


RDW  12.8    (11.5-15.5)  %


 


Plt Count  459 H D    (150-450)  k/uL


 


Neutrophils %  77    %


 


Lymphocytes %  15    %


 


Monocytes %  5    %


 


Eosinophils %  1    %


 


Basophils %  0    %


 


Neutrophils #  11.2 H    (1.3-7.7)  k/uL


 


Lymphocytes #  2.2    (1.0-4.8)  k/uL


 


Monocytes #  0.7    (0-1.0)  k/uL


 


Eosinophils #  0.2    (0-0.7)  k/uL


 


Basophils #  0.0    (0-0.2)  k/uL


 


Sodium   137   (137-145)  mmol/L


 


Potassium   4.6   (3.5-5.1)  mmol/L


 


Chloride   103   ()  mmol/L


 


Carbon Dioxide   26   (22-30)  mmol/L


 


Anion Gap   8   mmol/L


 


BUN   16   (7-17)  mg/dL


 


Creatinine   1.00   (0.52-1.04)  mg/dL


 


Est GFR (CKD-EPI)AfAm   83   (>60 ml/min/1.73 sqM)  


 


Est GFR (CKD-EPI)NonAf   72   (>60 ml/min/1.73 sqM)  


 


Glucose   96   (74-99)  mg/dL


 


Plasma Lactic Acid Sawyer    1.3  (0.7-2.0)  mmol/L


 


Calcium   9.9   (8.4-10.2)  mg/dL


 


Total Bilirubin   0.3   (0.2-1.3)  mg/dL


 


AST   25   (14-36)  U/L


 


ALT   71 H   (4-34)  U/L


 


Alkaline Phosphatase   95   ()  U/L


 


Total Protein   7.5   (6.3-8.2)  g/dL


 


Albumin   4.3   (3.5-5.0)  g/dL


 


Amylase   81   ()  U/L


 


Lipase   241   ()  U/L


 


Urine Color     


 


Urine Appearance     (Clear)  


 


Urine pH     (5.0-8.0)  


 


Ur Specific Gravity     (1.001-1.035)  


 


Urine Protein     (Negative)  


 


Urine Glucose (UA)     (Negative)  


 


Urine Ketones     (Negative)  


 


Urine Blood     (Negative)  


 


Urine Nitrite     (Negative)  


 


Urine Bilirubin     (Negative)  


 


Urine Urobilinogen     (<2.0)  mg/dL


 


Ur Leukocyte Esterase     (Negative)  


 


Urine RBC     (0-5)  /hpf


 


Urine WBC     (0-5)  /hpf


 


Ur Squamous Epith Cells     (0-4)  /hpf














  05/03/20 Range/Units





  20:15 


 


WBC   (3.8-10.6)  k/uL


 


RBC   (3.80-5.40)  m/uL


 


Hgb   (11.4-16.0)  gm/dL


 


Hct   (34.0-46.0)  %


 


MCV   (80.0-100.0)  fL


 


MCH   (25.0-35.0)  pg


 


MCHC   (31.0-37.0)  g/dL


 


RDW   (11.5-15.5)  %


 


Plt Count   (150-450)  k/uL


 


Neutrophils %   %


 


Lymphocytes %   %


 


Monocytes %   %


 


Eosinophils %   %


 


Basophils %   %


 


Neutrophils #   (1.3-7.7)  k/uL


 


Lymphocytes #   (1.0-4.8)  k/uL


 


Monocytes #   (0-1.0)  k/uL


 


Eosinophils #   (0-0.7)  k/uL


 


Basophils #   (0-0.2)  k/uL


 


Sodium   (137-145)  mmol/L


 


Potassium   (3.5-5.1)  mmol/L


 


Chloride   ()  mmol/L


 


Carbon Dioxide   (22-30)  mmol/L


 


Anion Gap   mmol/L


 


BUN   (7-17)  mg/dL


 


Creatinine   (0.52-1.04)  mg/dL


 


Est GFR (CKD-EPI)AfAm   (>60 ml/min/1.73 sqM)  


 


Est GFR (CKD-EPI)NonAf   (>60 ml/min/1.73 sqM)  


 


Glucose   (74-99)  mg/dL


 


Plasma Lactic Acid Sawyer   (0.7-2.0)  mmol/L


 


Calcium   (8.4-10.2)  mg/dL


 


Total Bilirubin   (0.2-1.3)  mg/dL


 


AST   (14-36)  U/L


 


ALT   (4-34)  U/L


 


Alkaline Phosphatase   ()  U/L


 


Total Protein   (6.3-8.2)  g/dL


 


Albumin   (3.5-5.0)  g/dL


 


Amylase   ()  U/L


 


Lipase   ()  U/L


 


Urine Color  Colorless  


 


Urine Appearance  Clear  (Clear)  


 


Urine pH  6.5  (5.0-8.0)  


 


Ur Specific Gravity  1.003  (1.001-1.035)  


 


Urine Protein  Negative  (Negative)  


 


Urine Glucose (UA)  Negative  (Negative)  


 


Urine Ketones  Negative  (Negative)  


 


Urine Blood  Trace H  (Negative)  


 


Urine Nitrite  Negative  (Negative)  


 


Urine Bilirubin  Negative  (Negative)  


 


Urine Urobilinogen  <2.0  (<2.0)  mg/dL


 


Ur Leukocyte Esterase  Negative  (Negative)  


 


Urine RBC  1  (0-5)  /hpf


 


Urine WBC  <1  (0-5)  /hpf


 


Ur Squamous Epith Cells  1  (0-4)  /hpf














Disposition


Clinical Impression: 


 Abdominal pain, Back pain





Disposition: ADMITTED AS IP TO THIS HOSP


Referrals: 


Radha Shen DO [Primary Care Provider] - 1-2 days


Time of Disposition: 22:17

## 2020-05-04 RX ADMIN — IOPAMIDOL PRN ML: 612 INJECTION, SOLUTION INTRAVENOUS at 12:13

## 2020-05-04 RX ADMIN — KETOROLAC TROMETHAMINE PRN MG: 30 INJECTION, SOLUTION INTRAMUSCULAR at 16:51

## 2020-05-04 RX ADMIN — IOPAMIDOL PRN ML: 612 INJECTION, SOLUTION INTRAVENOUS at 13:05

## 2020-05-04 RX ADMIN — FAMOTIDINE SCH MG: 20 TABLET, FILM COATED ORAL at 20:35

## 2020-05-04 RX ADMIN — PANTOPRAZOLE SODIUM SCH MG: 40 INJECTION, POWDER, FOR SOLUTION INTRAVENOUS at 15:08

## 2020-05-04 RX ADMIN — MORPHINE SULFATE PRN MG: 2 INJECTION, SOLUTION INTRAMUSCULAR; INTRAVENOUS at 00:14

## 2020-05-04 RX ADMIN — KETOROLAC TROMETHAMINE PRN MG: 30 INJECTION, SOLUTION INTRAMUSCULAR at 08:23

## 2020-05-04 RX ADMIN — MORPHINE SULFATE PRN MG: 2 INJECTION, SOLUTION INTRAMUSCULAR; INTRAVENOUS at 20:35

## 2020-05-04 RX ADMIN — KETOROLAC TROMETHAMINE PRN MG: 30 INJECTION, SOLUTION INTRAMUSCULAR at 02:32

## 2020-05-04 RX ADMIN — TAMSULOSIN HYDROCHLORIDE SCH MG: 0.4 CAPSULE ORAL at 20:35

## 2020-05-04 NOTE — P.HPIM
History of Present Illness


40-year-old pleasant female came in with complains of right flank pain radiating

to right midabdominal area.  Sharp pain 8/10 in severity.  Patient was seen in 

ER on 27th with similar complaints computed tomography scan was opted at that 

time computed tomography scan was negative and patient was given Bactrim urine 

at that time was positive for E. coli on cultures which is sensitive to Bactrim 

patient has been on Bactrim still can use to complain of complaints of dysuria 

urine analysis although is negative for any infection except for mild hematuria 

as patient is partially treated with Bactrim I do not expect urine cultures to 

be positive patient will be started on Rocephin patient was also complaining of 

nausea vomiting 1 episode on Saturday.  She does have leukocytosis without any 

fever.  Patient's pain is constant and not related to food but patient one time 

throughout after eating.  Patient doesn't have any significant CVA tenderness 

patient had a normal bowel movement couple days ago patient was having the 

dysuria and increased urinary frequency





Review of Systems








REVIEW OF SYSTEMS: 


CONSTITUTIONAL: No fever, no malaise, no fatigue. 


HEENT: No recent visual problems or hearing problems. Denied any sore throat. 


CARDIOVASCULAR: No chest pain, orthopnea, PND, no palpitations, no syncope. 


PULMONARY: No shortness of breath, no cough, no hemoptysis. 


GASTROINTESTINAL: No diarrhea.


NEUROLOGICAL: No headaches, no weakness, no numbness. 


HEMATOLOGICAL: Denies any bleeding or petechiae. 


GENITOURINARY: As mentioned in HPI


MUSCULOSKELETAL/RHEUMATOLOGICAL: Denies any joint pain, swelling, or any muscle 

pain. 


ENDOCRINE: Denies any polyuria or polydipsia. 





The rest of the 14-point review of systems is negative.











Past Medical History


Past Medical History: GERD/Reflux, Thyroid Disorder


Additional Past Medical History / Comment(s): PCOS.  migraines


History of Any Multi-Drug Resistant Organisms: ESBL


Date of last positivie culture/infection: 4/9/18


MDRO Source:: ESBL URINE


Past Surgical History: Cholecystectomy, Hernia Repair


Additional Past Surgical History / Comment(s): c section.  jodi around 18 years

ago


Past Anesthesia/Blood Transfusion Reactions: Postoperative Nausea & Vomiting 

(PONV)


Past Psychological History: No Psychological Hx Reported


Smoking Status: Never smoker


Past Alcohol Use History: None Reported


Past Drug Use History: None Reported





- Past Family History


  ** Mother


Family Medical History: No Reported History





Medications and Allergies


                                Home Medications











 Medication  Instructions  Recorded  Confirmed  Type


 


Levothyroxine Sodium [Synthroid] 75 mcg PO DAILY 01/03/19 05/04/20 History


 


Citalopram Hydrobromide [CeleXA] 20 mg PO DAILY 05/04/20 05/04/20 History


 


Ibuprofen [Advil] 200 mg PO Q8HR PRN 05/04/20 05/04/20 History








                                    Allergies











Allergy/AdvReac Type Severity Reaction Status Date / Time


 


gadobenate dimeglumine Allergy  Rash/Hives Verified 05/04/20 08:19





[From [facility]]     














Physical Exam


Vitals: 


                                   Vital Signs











  Temp Pulse Pulse Pulse Resp BP BP


 


 05/04/20 09:40  97.9 F   58 L   16   105/69


 


 05/04/20 08:19  97.5 F L   60   16   102/61


 


 05/04/20 08:00    58 L  64  16  


 


 05/04/20 00:38  98.4 F    64  18   130/82


 


 05/03/20 23:40   60    16  110/69 


 


 05/03/20 19:47  97.4 F L  73    18  135/82 














  Pulse Ox


 


 05/04/20 09:40  99


 


 05/04/20 08:19  96


 


 05/04/20 08:00 


 


 05/04/20 00:38  100


 


 05/03/20 23:40  99


 


 05/03/20 19:47  100








                                Intake and Output











 05/03/20 05/04/20 05/04/20





 22:59 06:59 14:59


 


Intake Total  550 


 


Balance  550 


 


Intake:   


 


  Intake, IV Titration  550 





  Amount   


 


    Sodium Chloride 0.9% 500  500 





    ml 500 ml @ 999 mls/hr IV   





    .Q31M STA Rx#:[id number]   


 


    cefTRIAXone 2 gm In  50 





    Sodium Chloride 0.9% 50   





    ml @ 100 mls/hr IVPB ONCE   





    ONE Rx#:[id number]   


 


  Oral  0 


 


Other:   


 


  # Voids  4 4


 


  Weight 63.503 kg 66.5 kg 

















PHYSICAL EXAMINATION: 





GENERAL: The patient is alert and oriented x3, not in any acute distress. Well 

developed, well nourished. 


HEENT: Pupils are round and equally reacting to light. EOMI. No scleral icterus.

No conjunctival pallor. Normocephalic, atraumatic. No pharyngeal erythema. No 

thyromegaly. 


CARDIOVASCULAR: S1 and S2 present. No murmurs, rubs, or gallops. 


PULMONARY: Chest is clear to auscultation, no wheezing or crackles. 


ABDOMEN: Soft, tenderness in the right flank area but no CVA tenderness, 

nondistended, normoactive bowel sounds. No palpable organomegaly. 


MUSCULOSKELETAL: No joint swelling or deformity.


EXTREMITIES: No cyanosis, clubbing, or pedal edema. 


NEUROLOGICAL: Gross neurological examination did not reveal any focal deficits. 


SKIN: No rashes. 














Results


CBC & Chem 7: 


                                 05/03/20 20:09





                                 05/03/20 20:09


Labs: 


                  Abnormal Lab Results - Last 24 Hours (Table)











  05/03/20 05/03/20 05/03/20 Range/Units





  20:09 20:09 20:15 


 


WBC  14.5 H    (3.8-10.6)  k/uL


 


Hct  48.0 H    (34.0-46.0)  %


 


Plt Count  459 H D    (150-450)  k/uL


 


Neutrophils #  11.2 H    (1.3-7.7)  k/uL


 


ALT   71 H   (4-34)  U/L


 


Urine Blood    Trace H  (Negative)  














Thrombosis Risk Factor Assmnt





- Choose All That Apply


Each Factor Represents 1 point: Obesity (BMI >25)


Thrombosis Risk Factor Assessment Total Risk Factor Score: 1


Thrombosis Risk Factor Assessment Level: Low Risk





Assessment and Plan


Plan: 


-Abdominal pain: Probably secondary to pyelonephritis which cannot be completely

ruled out will obtain a CAT scan of the abdomen and pelvis again to see there is

any enhancement of the renal pelvis.  Patient's he is negative as patient was 

partially treated with Bactrim I do not expect and cultures to be positive the 

patient had E. coli in the past patient will be started on Rocephin will see if 

there is any symptomatic improvement on that patient will be given Toradol for 

pain and nausea and vomiting is probably secondary to urinary tract infection 

neurosurgery will evaluate the patient, will monitor the area for any clinical 

improvement there is clinical improvement patient will be discharged on Ceftin 

for total course of antibiotic being 7 days.


-Leukocytosis probably secondary to urinary tract infection as mentioned above


-Gastric esophageal reflux disease patient was started on Protonix


-Hypothyroidism patient is not on any medications for this this can be evaluated

as an outpatient patient has history of PCO S and migraines in the past

## 2020-05-04 NOTE — CT
EXAMINATION TYPE: CT abdomen pelvis w con

 

DATE OF EXAM: 5/4/2020

 

COMPARISON: 4/26/2020

 

HISTORY: Pylenephritis

 

CT DLP: 906.6 mGycm

 

CONTRAST: 

CT scan of the abdomen and pelvis is performed with Oral Contrast and with IV Contrast, patient injec
cindy with 100 ml mL of Isovue 300.

 

FINDINGS: 

LUNG BASES-: No visible nodule.  No infiltrate. 

 

LIVER/GB:   The gallbladder is surgically absent.  No space occupying hepatic lesion. Biliary tree is
 of normal caliber. 

 

PANCREAS:  No inflammation.  No distinct mass. 

 

SPLEEN:  No splenic enlargement.  No lesion seen. 

 

ADRENALS:  No nodule.  No thickening. 

 

KIDNEYS/BLADDER: Mild right-sided hydronephrosis noted secondary to a 3.3 mm right UVJ calculus. Hete
rogenous enhancement pattern on the delayed images suggest possible right-sided pyelonephritis. Left 
kidney is free of nephrolithiasis or hilar nephritis. No hydronephrosis seen in the left kidney.

 

BOWEL: Normal appendix.  Normal bowel caliber.  No inflammation.

 

GENITAL ORGANS:  Left ovarian cyst measuring 4.3 cm is stable. 

 

LYMPH NODES:  No greater than 1cm abdominal or pelvic lymph nodes are appreciated.

 

AORTA: No significant abnormality. 

 

OSSEOUS STRUCTURES:  No significant abnormality is seen. 

 

OTHER:  No significant additional abnormality is seen. 

 

IMPRESSION: 

1.  Mild right-sided hydronephrosis noted secondary to a 3.3 mm right UVJ calculus. 

2.  Heterogenous enhancement pattern on the delayed images suggests possible right-sided pyelonephrit
is.

## 2020-05-04 NOTE — P.GSCN
<Marcelina Johnson - Last Filed: 05/04/20 10:32>





History of Present Illness


Consult date: 05/04/20


Reason for Consult: 





abdominal pain





Requesting physician: Titus Morales


History of present illness: 





CHIEF COMPLAINT: CVA tenderness, abdominal pain





HISTORY OF PRESENT ILLNESS: 40-year-old female who presented to the emergency 

room with chief complaint of right flank pain.  Patient states she has been 

experiencing this pain for a few weeks.  She reports having a fever on and off 

for the last week.  She was evaluated in the emergency room on 04/26/2020.  

Patient had a computed tomography scan performed at that time revealing a 4 cm 

left ovarian cyst.  She was also diagnosed with a urinary tract infection and 

discharged home on Bactrim.  Urine culture performed at that time was positive 

for E. coli and was susceptible to Bactrim.  Patients pain is mostly in the 

right flank region. She does complain of some mid right sided abdominal pain and

a little discomfort on the mid left side of her abdomen. She is passing flatus. 

She reports having a bowel movement 2 days ago which she states is her normal 

bowel pattern.  She reports having nausea and vomiting on Saturday.  Patient 

continues to complain of urinary complaints including dysuria, increased 

frequency, urgency, and occasional incontinence if she cannot get to the 

bathroom in time.





PAST MEDICAL HISTORY: 


See list.





PAST SURGICAL HISTORY: 


See list.





SOCIAL HISTORY: No illicit drug use.  





REVIEW OF SYSTEMS: 


CONSTITUTIONAL: Reports fever.


HEENT: Denies blurred vision, vision changes, or eye pain. Denies hemoptysis 


CARDIOVASCULAR: Denies chest pain or pressure.


RESPIRATORY: No shortness of breath. 


GASTROINTESTINAL: Refer to HPI for pertinent findings


HEMATOLOGIC: Denies bleeding disorders.


GENITOURINARY:  See HPI for pertinent findings


SKIN: Denies pruitis. Denies rash.





PHYSICAL EXAM: 


VITAL SIGNS: Reviewed.


GENERAL: Well-developed in no acute distress. 


HEENT:  No sclera icterus. Extraocular movements grossly intact.  Moist buccal 

mucosa. Head is atraumatic, normocephalic. 


ABDOMEN:  Soft.  Nondistended. Right CVA tenderness. Mild mid left and right 

tenderness with palpation.


NEUROLOGIC: Alert and oriented. Cranial nerves II through XII grossly intact.





LABORATORY DATA:


WBC 14.5.  Hemoglobin 15.9.  Platelet count 459.


Urinalysis reveals trace blood, otherwise unremarkable





IMAGING:


CT abdomen and pelvis completed on 04/26/2020 revealed 4 cm left ovarian cyst





ASSESSMENT: 


1.  Right flank pain, fever, nausea


2.  Dysuria, increased frequency, urgency, and occasional incontinence with a 

recent diagnosis of UTI, culture positive for Ecoli


3.  History of cholecystectomy





PLAN: 


Begin clear liquid diet


No surgical intervention recommended


She may benefit from urology/nephrology consult for further workup





Nurse practitioner note has been reviewed by physician. Signing provider agrees 

with the documented findings, assessment, and plan of care. 








Past Medical History


Past Medical History: GERD/Reflux, Thyroid Disorder


Additional Past Medical History / Comment(s): PCOS.  migraines


History of Any Multi-Drug Resistant Organisms: ESBL


Year Discovered:: 4/9/18


MDRO Source:: ESBL URINE


Past Surgical History: Cholecystectomy, Hernia Repair


Additional Past Surgical History / Comment(s): c section.  jodi around 18 years

ago


Past Anesthesia/Blood Transfusion Reactions: Postoperative Nausea & Vomiting 

(PONV)


Past Psychological History: No Psychological Hx Reported


Smoking Status: Never smoker


Past Alcohol Use History: None Reported


Past Drug Use History: None Reported





- Past Family History


  ** Mother


Family Medical History: No Reported History





Medications and Allergies


                                Home Medications











 Medication  Instructions  Recorded  Confirmed  Type


 


Levothyroxine Sodium [Synthroid] 75 mcg PO DAILY 01/03/19 05/04/20 History


 


Citalopram Hydrobromide [CeleXA] 20 mg PO DAILY 05/04/20 05/04/20 History


 


Ibuprofen [Advil] 200 mg PO Q8HR PRN 05/04/20 05/04/20 History








                                    Allergies











Allergy/AdvReac Type Severity Reaction Status Date / Time


 


gadobenate dimeglumine Allergy  Rash/Hives Verified 05/04/20 08:19





[facility]     














Surgical - Exam


                                   Vital Signs











Temp Pulse Resp BP Pulse Ox


 


 97.4 F L  73   18   135/82   100 


 


 05/03/20 19:47  05/03/20 19:47  05/03/20 19:47  05/03/20 19:47  05/03/20 19:47














Results





- Labs





                                 05/03/20 20:09





                                 05/03/20 20:09


                  Abnormal Lab Results - Last 24 Hours (Table)











  05/03/20 05/03/20 05/03/20 Range/Units





  20:09 20:09 20:15 


 


WBC  14.5 H    (3.8-10.6)  k/uL


 


Hct  48.0 H    (34.0-46.0)  %


 


Plt Count  459 H D    (150-450)  k/uL


 


Neutrophils #  11.2 H    (1.3-7.7)  k/uL


 


ALT   71 H   (4-34)  U/L


 


Urine Blood    Trace H  (Negative)  








                                 Diabetes panel











  05/03/20 Range/Units





  20:09 


 


Sodium  137  (137-145)  mmol/L


 


Potassium  4.6  (3.5-5.1)  mmol/L


 


Chloride  103  ()  mmol/L


 


Carbon Dioxide  26  (22-30)  mmol/L


 


BUN  16  (7-17)  mg/dL


 


Creatinine  1.00  (0.52-1.04)  mg/dL


 


Glucose  96  (74-99)  mg/dL


 


Calcium  9.9  (8.4-10.2)  mg/dL


 


AST  25  (14-36)  U/L


 


ALT  71 H  (4-34)  U/L


 


Alkaline Phosphatase  95  ()  U/L


 


Total Protein  7.5  (6.3-8.2)  g/dL


 


Albumin  4.3  (3.5-5.0)  g/dL








                                  Calcium panel











  05/03/20 Range/Units





  20:09 


 


Calcium  9.9  (8.4-10.2)  mg/dL


 


Albumin  4.3  (3.5-5.0)  g/dL








                                 Pituitary panel











  05/03/20 Range/Units





  20:09 


 


Sodium  137  (137-145)  mmol/L


 


Potassium  4.6  (3.5-5.1)  mmol/L


 


Chloride  103  ()  mmol/L


 


Carbon Dioxide  26  (22-30)  mmol/L


 


BUN  16  (7-17)  mg/dL


 


Creatinine  1.00  (0.52-1.04)  mg/dL


 


Glucose  96  (74-99)  mg/dL


 


Calcium  9.9  (8.4-10.2)  mg/dL








                                  Adrenal panel











  05/03/20 Range/Units





  20:09 


 


Sodium  137  (137-145)  mmol/L


 


Potassium  4.6  (3.5-5.1)  mmol/L


 


Chloride  103  ()  mmol/L


 


Carbon Dioxide  26  (22-30)  mmol/L


 


BUN  16  (7-17)  mg/dL


 


Creatinine  1.00  (0.52-1.04)  mg/dL


 


Glucose  96  (74-99)  mg/dL


 


Calcium  9.9  (8.4-10.2)  mg/dL


 


Total Bilirubin  0.3  (0.2-1.3)  mg/dL


 


AST  25  (14-36)  U/L


 


ALT  71 H  (4-34)  U/L


 


Alkaline Phosphatase  95  ()  U/L


 


Total Protein  7.5  (6.3-8.2)  g/dL


 


Albumin  4.3  (3.5-5.0)  g/dL














<Emeterio Ricketts - Last Filed: 05/04/20 13:03>





History of Present Illness


History of present illness: 


As above.  Patient still having right-sided pain.  Suspect pyelonephritis last 

visit.  Unsure why the patient is still having symptoms at this time.  We'll 

review repeat CAT scan once is obtained.  Continue antibiotics for now.








Surgical - Exam


                                   Vital Signs











Temp Pulse Resp BP Pulse Ox


 


 97.4 F L  73   18   135/82   100 


 


 05/03/20 19:47  05/03/20 19:47  05/03/20 19:47  05/03/20 19:47  05/03/20 19:47














Results





- Labs





                                 05/03/20 20:09





                                 05/03/20 20:09


                  Abnormal Lab Results - Last 24 Hours (Table)











  05/03/20 05/03/20 05/03/20 Range/Units





  20:09 20:09 20:15 


 


WBC  14.5 H    (3.8-10.6)  k/uL


 


Hct  48.0 H    (34.0-46.0)  %


 


Plt Count  459 H D    (150-450)  k/uL


 


Neutrophils #  11.2 H    (1.3-7.7)  k/uL


 


ALT   71 H   (4-34)  U/L


 


Urine Blood    Trace H  (Negative)  








                                 Diabetes panel











  05/03/20 Range/Units





  20:09 


 


Sodium  137  (137-145)  mmol/L


 


Potassium  4.6  (3.5-5.1)  mmol/L


 


Chloride  103  ()  mmol/L


 


Carbon Dioxide  26  (22-30)  mmol/L


 


BUN  16  (7-17)  mg/dL


 


Creatinine  1.00  (0.52-1.04)  mg/dL


 


Glucose  96  (74-99)  mg/dL


 


Calcium  9.9  (8.4-10.2)  mg/dL


 


AST  25  (14-36)  U/L


 


ALT  71 H  (4-34)  U/L


 


Alkaline Phosphatase  95  ()  U/L


 


Total Protein  7.5  (6.3-8.2)  g/dL


 


Albumin  4.3  (3.5-5.0)  g/dL








                                  Calcium panel











  05/03/20 Range/Units





  20:09 


 


Calcium  9.9  (8.4-10.2)  mg/dL


 


Albumin  4.3  (3.5-5.0)  g/dL








                                 Pituitary panel











  05/03/20 Range/Units





  20:09 


 


Sodium  137  (137-145)  mmol/L


 


Potassium  4.6  (3.5-5.1)  mmol/L


 


Chloride  103  ()  mmol/L


 


Carbon Dioxide  26  (22-30)  mmol/L


 


BUN  16  (7-17)  mg/dL


 


Creatinine  1.00  (0.52-1.04)  mg/dL


 


Glucose  96  (74-99)  mg/dL


 


Calcium  9.9  (8.4-10.2)  mg/dL








                                  Adrenal panel











  05/03/20 Range/Units





  20:09 


 


Sodium  137  (137-145)  mmol/L


 


Potassium  4.6  (3.5-5.1)  mmol/L


 


Chloride  103  ()  mmol/L


 


Carbon Dioxide  26  (22-30)  mmol/L


 


BUN  16  (7-17)  mg/dL


 


Creatinine  1.00  (0.52-1.04)  mg/dL


 


Glucose  96  (74-99)  mg/dL


 


Calcium  9.9  (8.4-10.2)  mg/dL


 


Total Bilirubin  0.3  (0.2-1.3)  mg/dL


 


AST  25  (14-36)  U/L


 


ALT  71 H  (4-34)  U/L


 


Alkaline Phosphatase  95  ()  U/L


 


Total Protein  7.5  (6.3-8.2)  g/dL


 


Albumin  4.3  (3.5-5.0)  g/dL

## 2020-05-05 VITALS — SYSTOLIC BLOOD PRESSURE: 97 MMHG | DIASTOLIC BLOOD PRESSURE: 60 MMHG | HEART RATE: 60 BPM

## 2020-05-05 VITALS — TEMPERATURE: 98 F | RESPIRATION RATE: 17 BRPM

## 2020-05-05 RX ADMIN — CEFAZOLIN SCH: 330 INJECTION, POWDER, FOR SOLUTION INTRAMUSCULAR; INTRAVENOUS at 07:10

## 2020-05-05 RX ADMIN — CEFAZOLIN SCH: 330 INJECTION, POWDER, FOR SOLUTION INTRAMUSCULAR; INTRAVENOUS at 07:30

## 2020-05-05 RX ADMIN — PANTOPRAZOLE SODIUM SCH MG: 40 INJECTION, POWDER, FOR SOLUTION INTRAVENOUS at 07:30

## 2020-05-05 RX ADMIN — KETOROLAC TROMETHAMINE PRN MG: 30 INJECTION, SOLUTION INTRAMUSCULAR at 12:10

## 2020-05-05 RX ADMIN — TAMSULOSIN HYDROCHLORIDE SCH MG: 0.4 CAPSULE ORAL at 07:29

## 2020-05-05 RX ADMIN — KETOROLAC TROMETHAMINE PRN MG: 30 INJECTION, SOLUTION INTRAMUSCULAR at 07:29

## 2020-05-05 RX ADMIN — FAMOTIDINE SCH MG: 20 TABLET, FILM COATED ORAL at 07:29

## 2020-05-05 NOTE — P.PN
<Marcelina Johnson BETY - Last Filed: 05/05/20 09:37>





Subjective


Progress Note Date: 05/05/20





CHIEF COMPLAINT: CVA tenderness, abdominal pain





HISTORY OF PRESENT ILLNESS: patient examined this morning at bedside.  She 

underwent computed tomography scan yesterday revealing mild right-sided 

hydronephrosis secondary to 3.3 mm right UVJ calculus and right-sided 

pyelonephritis.  Patient reports improvement in pain this morning.  She denies 

nausea or vomiting. vital signs are stable.  She is afebrile.





PHYSICAL EXAM: 


VITAL SIGNS: Reviewed.


GENERAL: Well-developed in no acute distress. 


HEENT:  No sclera icterus. Extraocular movements grossly intact.  Moist buccal 

mucosa. Head is atraumatic, normocephalic. 


ABDOMEN:  Soft.  Nondistended. Mild right CVA tenderness. 


NEUROLOGIC: Alert and oriented. Cranial nerves II through XII grossly intact.





ASSESSMENT: 


1.  Right-sided hydronephrosis secondary to 3.3 mm right UVJ calculus and 

right-sided pyelonephritis


2.  Recent diagnosis of UTI, culture positive for Ecoli


3.  History of cholecystectomy





PLAN: 


Advance diet


Await urology consultation


No surgical intervention recommended


We will sign off. Please reconsult if needed





Nurse practitioner note has been reviewed by physician. Signing provider agrees 

with the documented findings, assessment, and plan of care. 








Objective





- Vital Signs


Vital signs: 


                                   Vital Signs











Temp  98.0 F   05/05/20 07:00


 


Pulse  60   05/05/20 07:00


 


Resp  17   05/05/20 07:00


 


BP  97/60   05/05/20 07:00


 


Pulse Ox  98   05/05/20 07:00








                                 Intake & Output











 05/04/20 05/05/20 05/05/20





 18:59 06:59 18:59


 


Intake Total  200 


 


Balance  200 


 


Intake:   


 


  Intake, IV Titration  200 





  Amount   


 


    Sodium Chloride 0.9% 1,  200 





    000 ml @ 100 mls/hr IV .   





    Q10H [facility] Rx#:[id number]   


 


Other:   


 


  # Voids 1 1 














- Labs


CBC & Chem 7: 


                                 05/03/20 20:09





                                 05/03/20 20:09





<Emeterio Ricketts - Last Filed: 05/05/20 11:13>





Subjective


As above.  Patient's CAT scan does confirm a distal ureteral stone.  We'll sign 

off.  Await urology recommendations.








Objective





- Vital Signs


Vital signs: 


                                   Vital Signs











Temp  98.0 F   05/05/20 07:00


 


Pulse  60   05/05/20 07:00


 


Resp  17   05/05/20 07:00


 


BP  97/60   05/05/20 07:00


 


Pulse Ox  98   05/05/20 07:00








                                 Intake & Output











 05/04/20 05/05/20 05/05/20





 18:59 06:59 18:59


 


Intake Total  200 400


 


Balance  200 400


 


Intake:   


 


  Intake, IV Titration  200 





  Amount   


 


    Sodium Chloride 0.9% 1,  200 





    000 ml @ 100 mls/hr IV .   





    [facility][id number]   


 


  Oral   400


 


Other:   


 


  # Voids 1 1 














- Labs


CBC & Chem 7: 


                                 05/03/20 20:09





                                 05/03/20 20:09

## 2020-05-05 NOTE — CDI
Documentation Clarification Form



Date: 05/05/2020 12:51:18 PM

From: Anayeli Richardson RN, CCDS

Phone: [phone]

MRN: [id number]

Admit Date: 05/05/2020 09:11:00 AM

Patient Name: Lacie Augustin

Visit Number: [id number]

Discharge Date:  





ATTENTION: The Clinical Documentation Specialists (CDI) and [facility] Coding Staff 
appreciate your assistance in clarifying documentation. Please respond to the 
clarification below the line at the bottom and electronically sign. The CDI & 
[facility] Coding staff will review the response and follow-up if needed. Please note: 
Queries are made part of the Legal Health Record. If you have any questions, 
please contact the author of this message via ITS.



Dr. Darlin Richardson



The patient presented with right flank pain radiating to right midabdominal 
area.  On 4/5 H&P has pyelonephritis and acuity of the pyelonephritis is 
requested.



History/Risk Factors: GERD, Thyroid Disorder



Clinical Indicators: 40 year-old female present to ED on 5/3 with complaints of 
abdominal pain. 

5/4 Abdomen/Pelvis Ct: Mild right-sided hydronephrosis noted secondary to 3.3 mm
right UVJ calculus. Heterogenous enhancement pattern on the delayed images 
suggests possible right-sided pyelonephritis.

5/3 Lab findings: 14.5, [id number],  UA: Ur Leukocyte Esterase negative;  
COVID-19 Not detected

Vital Signs: 135/82 73 18 97.4 100% RA



Treatment:

Rocephin 2 gm 5/3-5/5

Toradol 15 mg IVP q 6 hrs prn 

Zofran 4 mg IVP x1 5/4

.9 Saline 500cc bolus, continue @ 75 mls hr



In your professional opinion, can you please clarify acuity of the 
pyelonephritis?



Acute Pyelonephritis

Chronic Pyelonephritis

Other, please specify 

Unable to determine





(Last Revision: April 2018)

___________________________________________________________________________

Acute Pyelonephritis

MTDD

## 2020-05-05 NOTE — P.DS
Providers


Date of admission: 


05/05/20 09:11





Attending physician: 


Vaughn Rinaldi





Consults: 





                                        





05/03/20 22:18


Consult Physician Urgent 


   Consulting Provider: Emeterio Ricketts


   Consult Reason/Comments: Abdominal pain and CVA tenderness


   Do you want consulting provider notified?: Yes





05/04/20 18:55


Consult Physician Routine 


   Consulting Provider: Sudarshan Rodrigues


   Consult Reason/Comments: Right ureteral stone


   Do you want consulting provider notified?: Yes, Notify in am











Primary care physician: 


Radha [facility] Course: 





40-year-old pleasant female came in with complains of right flank pain radiating

to right midabdominal area.  Sharp pain 8/10 in severity.  Patient was seen in 

ER on 27th with similar complaints computed tomography scan was opted at that 

time computed tomography scan was negative and patient was given Bactrim urine 

at that time was positive for E. coli on cultures which is sensitive to Bactrim 

patient has been on Bactrim still can use to complain of complaints of dysuria 

urine analysis although is negative for any infection except for mild hematuria 

as patient is partially treated with Bactrim I do not expect urine cultures to 

be positive patient will be started on Rocephin patient was also complaining of 

nausea vomiting 1 episode on Saturday.  She does have leukocytosis without any 

fever.  Patient's pain is constant and not related to food but patient one time 

throughout after eating.  Patient doesn't have any significant CVA tenderness 

patient had a normal bowel movement couple days ago patient was having the 

dysuria and increased urinary frequency





05/05/2020


Patient's obesity scan is positive for of hydronephrosis on the right side with 

a 3.3 mm stone on the right side.  Patient pain has improved she may have passed

a stone.  Patient will be referred to urology and will be discharged today.  

Patient will be discharged on Ceftin, for 4 more days completing total 

antibiotic therapy of 5 days on cephalosporins.

















PHYSICAL EXAMINATION: 





GENERAL: The patient is alert and oriented x3, not in any acute distress. Well 

developed, well nourished. 


HEENT: Pupils are round and equally reacting to light. EOMI. No scleral icterus.

No conjunctival pallor. Normocephalic, atraumatic. No pharyngeal erythema. No 

thyromegaly. 


CARDIOVASCULAR: S1 and S2 present. No murmurs, rubs, or gallops. 


PULMONARY: Chest is clear to auscultation, no wheezing or crackles. 


ABDOMEN: Soft, nontender, nondistended, normoactive bowel sounds. No palpable 

organomegaly. 


MUSCULOSKELETAL: No joint swelling or deformity.


EXTREMITIES: No cyanosis, clubbing, or pedal edema. 


NEUROLOGICAL: Gross neurological examination did not reveal any focal deficits. 


SKIN: No rashes. 





-nephrolithiasis with pyelonephritis and and UTI secondary to renal 

calculipatient had E. coli which is sensitive to most antibiotics


-hypothyroidism











Plan - Discharge Summary


Discharge Rx Participant: Yes


New Discharge Prescriptions: 


New


   Cefuroxime Axetil [Ceftin] 500 mg PO BID 4 Days #8 tab





Discontinued


   Sulfamethox-Tmp 800-160Mg [Bactrim -160 mg] 1 tab PO Q12HR #28 tab





No Action


   Levothyroxine Sodium [Synthroid] 75 mcg PO DAILY


   Citalopram Hydrobromide [CeleXA] 20 mg PO DAILY


   Ibuprofen [Advil] 200 mg PO Q8HR PRN


     PRN Reason: Pain


Discharge Medication List





Levothyroxine Sodium [Synthroid] 75 mcg PO DAILY 01/03/19 [History]


Citalopram Hydrobromide [CeleXA] 20 mg PO DAILY 05/04/20 [History]


Ibuprofen [Advil] 200 mg PO Q8HR PRN 05/04/20 [History]


Cefuroxime Axetil [Ceftin] 500 mg PO BID 4 Days #8 tab 05/05/20 [Rx]








Follow up Appointment(s)/Referral(s): 


Radha Shen DO [Primary Care Provider] - 05/08/20 9:40 am (Please call office

to set up video confrence and carrier.  This appointment will be a video 

conference)


Sudarshan Rodrigues MD [STAFF PHYSICIAN] - 05/12/20 8:20 am


Patient Instructions/Handouts:  Kidney Stones (DC), How to Strain Your Urine 

(ED)

## 2022-01-12 ENCOUNTER — HOSPITAL ENCOUNTER (OUTPATIENT)
Dept: HOSPITAL 47 - RADCTMAIN | Age: 42
Discharge: HOME | End: 2022-01-12
Attending: FAMILY MEDICINE
Payer: COMMERCIAL

## 2022-01-12 ENCOUNTER — HOSPITAL ENCOUNTER (EMERGENCY)
Dept: HOSPITAL 47 - EC | Age: 42
Discharge: HOME | End: 2022-01-12
Payer: COMMERCIAL

## 2022-01-12 VITALS
SYSTOLIC BLOOD PRESSURE: 131 MMHG | RESPIRATION RATE: 18 BRPM | DIASTOLIC BLOOD PRESSURE: 91 MMHG | HEART RATE: 100 BPM | TEMPERATURE: 98.4 F

## 2022-01-12 DIAGNOSIS — K21.9: ICD-10-CM

## 2022-01-12 DIAGNOSIS — N83.291: ICD-10-CM

## 2022-01-12 DIAGNOSIS — R10.31: Primary | ICD-10-CM

## 2022-01-12 DIAGNOSIS — N89.8: ICD-10-CM

## 2022-01-12 DIAGNOSIS — N72: ICD-10-CM

## 2022-01-12 DIAGNOSIS — N39.0: Primary | ICD-10-CM

## 2022-01-12 DIAGNOSIS — Z90.49: ICD-10-CM

## 2022-01-12 DIAGNOSIS — E07.9: ICD-10-CM

## 2022-01-12 LAB
ALBUMIN SERPL-MCNC: 4.4 G/DL (ref 3.5–5)
ALP SERPL-CCNC: 91 U/L (ref 38–126)
ALT SERPL-CCNC: 43 U/L (ref 4–34)
ANION GAP SERPL CALC-SCNC: 10 MMOL/L
AST SERPL-CCNC: 39 U/L (ref 14–36)
BASOPHILS # BLD AUTO: 0.1 K/UL (ref 0–0.2)
BASOPHILS NFR BLD AUTO: 0 %
BUN SERPL-SCNC: 11 MG/DL (ref 7–17)
CALCIUM SPEC-MCNC: 9.5 MG/DL (ref 8.4–10.2)
CHLORIDE SERPL-SCNC: 105 MMOL/L (ref 98–107)
CO2 SERPL-SCNC: 23 MMOL/L (ref 22–30)
EOSINOPHIL # BLD AUTO: 0.2 K/UL (ref 0–0.7)
EOSINOPHIL NFR BLD AUTO: 2 %
ERYTHROCYTE [DISTWIDTH] IN BLOOD BY AUTOMATED COUNT: 5.08 M/UL (ref 3.8–5.4)
ERYTHROCYTE [DISTWIDTH] IN BLOOD: 12.4 % (ref 11.5–15.5)
GLUCOSE SERPL-MCNC: 105 MG/DL (ref 74–99)
HCT VFR BLD AUTO: 47.3 % (ref 34–46)
HGB BLD-MCNC: 15.8 GM/DL (ref 11.4–16)
LIPASE SERPL-CCNC: 36 U/L (ref 23–300)
LYMPHOCYTES # SPEC AUTO: 2.1 K/UL (ref 1–4.8)
LYMPHOCYTES NFR SPEC AUTO: 13 %
MCH RBC QN AUTO: 31.1 PG (ref 25–35)
MCHC RBC AUTO-ENTMCNC: 33.4 G/DL (ref 31–37)
MCV RBC AUTO: 93.1 FL (ref 80–100)
MONOCYTES # BLD AUTO: 0.5 K/UL (ref 0–1)
MONOCYTES NFR BLD AUTO: 3 %
NEUTROPHILS # BLD AUTO: 12.5 K/UL (ref 1.3–7.7)
NEUTROPHILS NFR BLD AUTO: 81 %
PH UR: 7 [PH] (ref 5–8)
PLATELET # BLD AUTO: 245 K/UL (ref 150–450)
POTASSIUM SERPL-SCNC: 4.2 MMOL/L (ref 3.5–5.1)
PROT SERPL-MCNC: 7.5 G/DL (ref 6.3–8.2)
RBC UR QL: 2 /HPF (ref 0–5)
SODIUM SERPL-SCNC: 138 MMOL/L (ref 137–145)
SP GR UR: 1.04 (ref 1–1.03)
SQUAMOUS UR QL AUTO: 1 /HPF (ref 0–4)
UROBILINOGEN UR QL STRIP: <2 MG/DL (ref ?–2)
WBC # BLD AUTO: 15.4 K/UL (ref 3.8–10.6)
WBC # UR AUTO: 7 /HPF (ref 0–5)

## 2022-01-12 PROCEDURE — 85025 COMPLETE CBC W/AUTO DIFF WBC: CPT

## 2022-01-12 PROCEDURE — 96361 HYDRATE IV INFUSION ADD-ON: CPT

## 2022-01-12 PROCEDURE — 96365 THER/PROPH/DIAG IV INF INIT: CPT

## 2022-01-12 PROCEDURE — 87040 BLOOD CULTURE FOR BACTERIA: CPT

## 2022-01-12 PROCEDURE — 80053 COMPREHEN METABOLIC PANEL: CPT

## 2022-01-12 PROCEDURE — 99284 EMERGENCY DEPT VISIT MOD MDM: CPT

## 2022-01-12 PROCEDURE — 83690 ASSAY OF LIPASE: CPT

## 2022-01-12 PROCEDURE — 81025 URINE PREGNANCY TEST: CPT

## 2022-01-12 PROCEDURE — 87491 CHLMYD TRACH DNA AMP PROBE: CPT

## 2022-01-12 PROCEDURE — 81001 URINALYSIS AUTO W/SCOPE: CPT

## 2022-01-12 PROCEDURE — 93975 VASCULAR STUDY: CPT

## 2022-01-12 PROCEDURE — 96375 TX/PRO/DX INJ NEW DRUG ADDON: CPT

## 2022-01-12 PROCEDURE — 36415 COLL VENOUS BLD VENIPUNCTURE: CPT

## 2022-01-12 PROCEDURE — 76830 TRANSVAGINAL US NON-OB: CPT

## 2022-01-12 PROCEDURE — 74177 CT ABD & PELVIS W/CONTRAST: CPT

## 2022-01-12 PROCEDURE — 87591 N.GONORRHOEAE DNA AMP PROB: CPT

## 2022-01-12 NOTE — CT
EXAMINATION TYPE: CT abdomen pelvis w con

 

DATE OF EXAM: 1/12/2022

 

HISTORY: RLQ pain

 

CT DLP: 1434mGycm  Automated Exposure Control for Dose Reduction was Utilized.

 

CONTRAST: 

CT scan of the abdomen and pelvis is performed with oral and with IV Contrast, patient injected with 
100 mL of Isovue 300.

 

COMPARISON: CT abdomen and pelvis May 4, 2020

 

FINDINGS:

 

LUNG BASES: No significant abnormality is appreciated.

 

LIVER/GB: Cholecystectomy clips are redemonstrated.

 

PANCREAS: No significant abnormality is seen.

 

SPLEEN: No significant abnormality is seen.

 

ADRENALS: No significant abnormality is seen.

 

KIDNEYS: Symmetric cortical medullary uptake and excretion without hydronephrosis seen bilaterally.

 

BOWEL: Oral contrast reaches level of the cecum. No suspicious small or large bowel dilatation.  Norm
al-appearing appendix extends inferiorly from the cecum. 

 

UTERUS/ADNEXA: Anteverted uterus. Left ovarian 2.4 x 2.0 cm low dense lesion is smaller from prior st
udy axial image 66. Right ovary remains normal in size axial image 56

 

LYMPH NODES: No greater than 1cm abdominal or pelvic lymph nodes are appreciated.

 

OSSEOUS STRUCTURES: No significant abnormality is seen.

 

OTHER: Direct origin of the right common hepatic artery from the aorta which is a normal variant.

 

IMPRESSION: No CT evidence for acute appendicitis. No suspicious new or acute finding identified to a
ccount for patient's symptoms of right lower quadrant pain on today's study.

## 2022-01-12 NOTE — ED
Abdominal Pain HPI





- General


Source: patient, family, RN notes reviewed


Mode of arrival: wheelchair


Limitations: no limitations





<Scott Champion - Last Filed: 01/12/22 21:54>





<Dayan Connelly - Last Filed: 01/14/22 12:27>





- General


Chief Complaint: Abdominal Pain


Stated Complaint: abd pain


Time Seen by Provider: 01/12/22 17:41





- History of Present Illness


Initial Comments: 





This is a pleasant 41-year-old female who presents to emergency department 

complaining of lower abdominal pain since 5 AM this morning.  Pain is been 

constant, sharp, exacerbated by palpation and movement.  Does radiate to the 

back at times.  No chest pain or shortness of breath.  Patient was seen by her 

primary care physician this morning he had an outpatient ultrasound ordered and 

done which did not show any acute pathology.  She denies any amount of vomitus 

urination.  No nausea or vomiting.  Last menstrual period was 2 weeks ago.  No 

vaginal bleeding.  No vaginal discharge.  No fever or chills.  Patient is status

post cholecystectomy in the past and has a history of ovarian cyst as well 

(Scott Champion)





- Related Data


                                Home Medications











 Medication  Instructions  Recorded  Confirmed


 


Levothyroxine Sodium [Synthroid] 75 mcg PO DAILY 01/03/19 05/04/20


 


Citalopram Hydrobromide [CeleXA] 20 mg PO DAILY 05/04/20 05/04/20


 


Ibuprofen [Advil] 200 mg PO Q8HR PRN 05/04/20 05/04/20








                                  Previous Rx's











 Medication  Instructions  Recorded


 


Cefuroxime Axetil [Ceftin] 500 mg PO BID 4 Days #8 tab 05/05/20


 


Doxycycline Monohydrate [Monodox] 100 mg PO Q12HR #28 cap 01/12/22


 


HYDROcodone/APAP 5-325MG [Norco 5] 1 each PO Q6H PRN #12 tab 01/12/22


 


Naproxen [Naprosyn] 500 mg PO Q12HR #24 tab 01/12/22











                                    Allergies











Allergy/AdvReac Type Severity Reaction Status Date / Time


 


gadobenate dimeglumine Allergy  Rash/Hives Verified 01/12/22 15:57





[From [facility]]     














Review of Systems


ROS Other: All systems not noted in ROS Statement are negative.





<Scott Champion - Last Filed: 01/12/22 21:54>


ROS Other: All systems not noted in ROS Statement are negative.





<Dayan Connelly - Last Filed: 01/14/22 12:27>


ROS Statement: 


Those systems with pertinent positive or pertinent negative responses have been 

documented in the HPI.








Past Medical History


Past Medical History: GERD/Reflux, Thyroid Disorder


Additional Past Medical History / Comment(s): PCOS.  migraines


History of Any Multi-Drug Resistant Organisms: ESBL


Date of last positivie culture/infection: 4/9/18


MDRO Source:: ESBL URINE


Past Surgical History: Cholecystectomy, Hernia Repair


Additional Past Surgical History / Comment(s): c section.  jodi around 18 years

ago


Past Anesthesia/Blood Transfusion Reactions: Postoperative Nausea & Vomiting 

(PONV)


Past Psychological History: No Psychological Hx Reported


Smoking Status: Never smoker


Past Alcohol Use History: None Reported


Past Drug Use History: None Reported





- Past Family History


  ** Mother


Family Medical History: No Reported History





<AkiraScott - Last Filed: 01/12/22 21:54>





General Exam


Limitations: no limitations


General appearance: alert, in distress


Head exam: Present: atraumatic, normocephalic, normal inspection


Eye exam: Present: normal appearance, PERRL, EOMI.  Absent: scleral icterus, 

conjunctival injection, periorbital swelling


ENT exam: Present: normal exam, mucous membranes moist


Neck exam: Present: normal inspection.  Absent: tenderness, meningismus, 

lymphadenopathy


Respiratory exam: Present: normal lung sounds bilaterally.  Absent: respiratory 

distress, wheezes, rales, rhonchi, stridor


Cardiovascular Exam: Present: regular rate, normal rhythm, normal heart sounds. 

Absent: systolic murmur, diastolic murmur, rubs, gallop, clicks


GI/Abdominal exam: Present: soft, tenderness, guarding, normal bowel sounds, 

other (Patient has tenderness in the pelvic area, most notably in the suprapubic

and right pelvic area.).  Absent: distended, rebound, rigid


External exam: Present: normal external exam.  Absent: erythema, swelling, 

lesions, lacerations, ecchymosis


Speculum exam: Present: erythema, vaginal discharge, cervical discharge, other 

(Friable cervix with mild cervical motion tenderness).  Absent: foreign body


By manual exam: Present: cervical motion tenderness, uterine tenderness.  

Absent: adnexal tenderness, adnexal mass, uterine enlargement


Extremities exam: Present: normal inspection, full ROM, normal capillary refill.

 Absent: tenderness, pedal edema, joint swelling, calf tenderness


Back exam: Present: normal inspection


Neurological exam: Present: alert, oriented X3, CN II-XII intact


Psychiatric exam: Present: normal affect, normal mood


Skin exam: Present: warm, dry, intact, normal color.  Absent: rash





<Scott Champion - Last Filed: 01/12/22 21:54>





- General Exam Comments


Initial Comments: 





Patient in moderate distress.  Patient does not appear to be ill or toxic.  

Vital signs stable, patient afebrile.  Patient appears to be adequately 

hydrated. (Scott Champion)





Course





<Scott Champion - Last Filed: 01/12/22 21:54>





                                   Vital Signs











  01/12/22





  15:57


 


Temperature 98.4 F


 


Pulse Rate 100


 


Respiratory 18





Rate 


 


Blood Pressure 131/91


 


O2 Sat by Pulse 100





Oximetry 














- Reevaluation(s)


Reevaluation #1: 





01/12/22 19:21


Patient reevaluated, patient's urinalysis shows evidence of urinary tract 

infection recent the suspicion of pyelonephritis.  White blood cell count is 

elevated.  Blood cultures ordered.  Ceftriaxone ordered. (Scott Champion)





Medical Decision Making





- Lab Data


Result diagrams: 


                                 01/12/22 18:16





                                 01/12/22 18:16





- Radiology Data


Radiology results: report reviewed, image reviewed





<Scott Champion - Last Filed: 01/12/22 21:54>





- Lab Data


Result diagrams: 


                                 01/12/22 18:16





                                 01/12/22 18:16





<Dayan Connelly - Last Filed: 01/14/22 12:27>





- Medical Decision Making





Differential diagnosis includes ovarian cyst, ovarian torsion, pelvic 

inflammatory disease, endometriosis, mittelschmerz.





Patient has evidence of cervicitis with cervical discharge and easily friable 

cervix.  Mild cervical motion tenderness.  Suspect this may be related to early 

PID.  Discussed this with the patient.  Shunt noticed follow-up with her regular

physician tomorrow as well as her OB/GYN physician in [place].  I'm going to 

treat the patient with doxycycline twice a day for 2 weeks.  Pelvic rest until 

symptoms clear.  Short course of Norco.  Patient no she can return here at any 

time any symptoms worsen or problems arise.





The case was discussed in detail with ED attending physician.  Presentation, 

findings, treatment plan discussed in detail.





Patient was told to return to the ER for any signs or symptoms worsen.  Told to 

return immediately if any other problems arise.  All questions answered.  

Treatment plan discussed.  Patient in agreement (Scott Champion)


I was available for consultation in the emergency department.  The history and 

physical exam were done by the midlevel provider. I was consulted for this 

patients care. I reviewed the case with the midlevel provider and based on 

their presentation of the patient, I agree with the assessment, medical decision

making and plan of care as documented. 


Chart was dictated using Dragon dictation software.  Attempts were made to 

correct any dictation errors however some typographical errors may persist. 


Patient was seen during a [facility] of emergency due to the Covid-19 

pandemic.  (Dayan Connelly)





- Lab Data





                                   Lab Results











  01/12/22 01/12/22 01/12/22 Range/Units





  18:16 18:16 18:16 


 


WBC  15.4 H    (3.8-10.6)  k/uL


 


RBC  5.08    (3.80-5.40)  m/uL


 


Hgb  15.8    (11.4-16.0)  gm/dL


 


Hct  47.3 H    (34.0-46.0)  %


 


MCV  93.1    (80.0-100.0)  fL


 


MCH  31.1    (25.0-35.0)  pg


 


MCHC  33.4    (31.0-37.0)  g/dL


 


RDW  12.4    (11.5-15.5)  %


 


Plt Count  245    (150-450)  k/uL


 


MPV  7.0    


 


Neutrophils %  81    %


 


Lymphocytes %  13    %


 


Monocytes %  3    %


 


Eosinophils %  2    %


 


Basophils %  0    %


 


Neutrophils #  12.5 H    (1.3-7.7)  k/uL


 


Lymphocytes #  2.1    (1.0-4.8)  k/uL


 


Monocytes #  0.5    (0-1.0)  k/uL


 


Eosinophils #  0.2    (0-0.7)  k/uL


 


Basophils #  0.1    (0-0.2)  k/uL


 


Sodium     (137-145)  mmol/L


 


Potassium     (3.5-5.1)  mmol/L


 


Chloride     ()  mmol/L


 


Carbon Dioxide     (22-30)  mmol/L


 


Anion Gap     mmol/L


 


BUN     (7-17)  mg/dL


 


Creatinine     (0.52-1.04)  mg/dL


 


Est GFR (CKD-EPI)AfAm     (>60 ml/min/1.73 sqM)  


 


Est GFR (CKD-EPI)NonAf     (>60 ml/min/1.73 sqM)  


 


Glucose     (74-99)  mg/dL


 


Calcium     (8.4-10.2)  mg/dL


 


Total Bilirubin     (0.2-1.3)  mg/dL


 


AST     (14-36)  U/L


 


ALT     (4-34)  U/L


 


Alkaline Phosphatase     ()  U/L


 


Total Protein     (6.3-8.2)  g/dL


 


Albumin     (3.5-5.0)  g/dL


 


Lipase     ()  U/L


 


Urine Color   Light Yellow   


 


Urine Appearance   Clear   (Clear)  


 


Urine pH   7.0   (5.0-8.0)  


 


Ur Specific Gravity   1.036 H   (1.001-1.035)  


 


Urine Protein   Negative   (Negative)  


 


Urine Glucose (UA)   Negative   (Negative)  


 


Urine Ketones   Negative   (Negative)  


 


Urine Blood   Moderate H   (Negative)  


 


Urine Nitrite   Positive H   (Negative)  


 


Urine Bilirubin   Negative   (Negative)  


 


Urine Urobilinogen   <2.0   (<2.0)  mg/dL


 


Ur Leukocyte Esterase   Small H   (Negative)  


 


Urine RBC   2   (0-5)  /hpf


 


Urine WBC   7 H   (0-5)  /hpf


 


Ur Squamous Epith Cells   1   (0-4)  /hpf


 


Urine Mucus   Rare H   (None)  /hpf


 


Urine HCG, Qual    Not Detected  (Not Detectd)  














  01/12/22 Range/Units





  18:16 


 


WBC   (3.8-10.6)  k/uL


 


RBC   (3.80-5.40)  m/uL


 


Hgb   (11.4-16.0)  gm/dL


 


Hct   (34.0-46.0)  %


 


MCV   (80.0-100.0)  fL


 


MCH   (25.0-35.0)  pg


 


MCHC   (31.0-37.0)  g/dL


 


RDW   (11.5-15.5)  %


 


Plt Count   (150-450)  k/uL


 


MPV   


 


Neutrophils %   %


 


Lymphocytes %   %


 


Monocytes %   %


 


Eosinophils %   %


 


Basophils %   %


 


Neutrophils #   (1.3-7.7)  k/uL


 


Lymphocytes #   (1.0-4.8)  k/uL


 


Monocytes #   (0-1.0)  k/uL


 


Eosinophils #   (0-0.7)  k/uL


 


Basophils #   (0-0.2)  k/uL


 


Sodium  138  (137-145)  mmol/L


 


Potassium  4.2  (3.5-5.1)  mmol/L


 


Chloride  105  ()  mmol/L


 


Carbon Dioxide  23  (22-30)  mmol/L


 


Anion Gap  10  mmol/L


 


BUN  11  (7-17)  mg/dL


 


Creatinine  0.54  (0.52-1.04)  mg/dL


 


Est GFR (CKD-EPI)AfAm  >90  (>60 ml/min/1.73 sqM)  


 


Est GFR (CKD-EPI)NonAf  >90  (>60 ml/min/1.73 sqM)  


 


Glucose  105 H  (74-99)  mg/dL


 


Calcium  9.5  (8.4-10.2)  mg/dL


 


Total Bilirubin  0.7  (0.2-1.3)  mg/dL


 


AST  39 H  (14-36)  U/L


 


ALT  43 H  (4-34)  U/L


 


Alkaline Phosphatase  91  ()  U/L


 


Total Protein  7.5  (6.3-8.2)  g/dL


 


Albumin  4.4  (3.5-5.0)  g/dL


 


Lipase  36  ()  U/L


 


Urine Color   


 


Urine Appearance   (Clear)  


 


Urine pH   (5.0-8.0)  


 


Ur Specific Gravity   (1.001-1.035)  


 


Urine Protein   (Negative)  


 


Urine Glucose (UA)   (Negative)  


 


Urine Ketones   (Negative)  


 


Urine Blood   (Negative)  


 


Urine Nitrite   (Negative)  


 


Urine Bilirubin   (Negative)  


 


Urine Urobilinogen   (<2.0)  mg/dL


 


Ur Leukocyte Esterase   (Negative)  


 


Urine RBC   (0-5)  /hpf


 


Urine WBC   (0-5)  /hpf


 


Ur Squamous Epith Cells   (0-4)  /hpf


 


Urine Mucus   (None)  /hpf


 


Urine HCG, Qual   (Not Detectd)  














Disposition


Is patient prescribed a controlled substance at d/c from ED?: Yes


When asked, does pt state using other controlled substances?: No


If prescribed controlled substance>3 days was MAPS reviewed?: No


Time of Disposition: 22:00





<Scott Champion - Last Filed: 01/12/22 21:54>





<Dayan Connelly - Last Filed: 01/14/22 12:27>


Clinical Impression: 


 UTI (urinary tract infection), Ovarian cyst, Cervicitis, Vaginal discharge





Disposition: HOME SELF-CARE


Condition: Stable


Instructions (If sedation given, give patient instructions):  Cervicitis (ED), 

Ovarian Cyst (ED), Urinary Tract Infection in Women (ED)


Additional Instructions: 


Follow-up with your regular physician as directed.  Return to the ER immediately

if any symptoms worsen, new symptoms arise, or any other problems develop.





Take antibiotics as directed.  Call tomorrow morning to set up a follow-up 

appointment with your regular physician and your OB/GYN physician.  Pelvic 

rest/no sexual activity until symptoms have resolved.


Prescriptions: 


Doxycycline Monohydrate [Monodox] 100 mg PO Q12HR #28 cap


Naproxen [Naprosyn] 500 mg PO Q12HR #24 tab


HYDROcodone/APAP 5-325MG [Norco 5] 1 each PO Q6H PRN #12 tab


 PRN Reason: Pain


Referrals: 


Radha Shen DO [Primary Care Provider] - 1-2 days

## 2022-01-12 NOTE — US
EXAMINATION TYPE: US transvaginal

 

DATE OF EXAM: 1/12/2022

 

COMPARISON: CT, US

 

CLINICAL HISTORY: Pelvic pain, rule out ovarian torsion. Pelvic pain. Hx C section, miscarriage. G2 P
1.  

 

TECHNIQUE:  Transvaginal (TV).  Limited, pt did try to empty her bladder.

 

Date of LMP:  Pt states about 2 weeks ago.

 

EXAM MEASUREMENTS:

 

Uterus:  7.6 x 5.1 x 4.1 cm

Endometrial Stripe: 1.12 cm

Right Ovary:  2.9 x 1.5 x 1.6 cm

Left Ovary:  3.8 x 2.3 x 2.8 cm

 

 

1. Uterus:  Anteverted. Appears heterogeneous. Anechoic areas seen within cervix and compatible with 
nabothian cysts.  Hypoechoic area anteriorly seen on sagittal images was not defined in transverse.

2. Endometrium:  Measures 1.12 cm, pt states LMP was about 2 weeks ago.

3. Right Ovary:  Anechoic areas seen, largest: 1.0 x 1.2 x 0.8 cm. 

4. Left Ovary:  Septated anechoic area seen: 1.8 x 1.3 x 1.3 cm.

**Spectral, color and waveform doppler imaging shows  arterial and venous flow within the ovaries. Di
fficult to Doppler right ovary due to position, arterial and venous waveforms appear to be present.

5. Bilateral Adnexa:  Appear wnl

6. Posterior cul-de-sac:  Appears wnl

 

 

IMPRESSION: 

 

Bilateral ovarian cysts measuring up to 1.8 cm and mildly complex on the left.

 

Otherwise no definite acute abnormality of the pelvic ultrasound.

## 2022-11-18 ENCOUNTER — HOSPITAL ENCOUNTER (OUTPATIENT)
Dept: HOSPITAL 47 - RADMAMWWP | Age: 42
Discharge: HOME | End: 2022-11-18
Attending: FAMILY MEDICINE
Payer: COMMERCIAL

## 2022-11-18 DIAGNOSIS — Z80.41: ICD-10-CM

## 2022-11-18 DIAGNOSIS — Z12.31: Primary | ICD-10-CM

## 2022-11-18 PROCEDURE — 77067 SCR MAMMO BI INCL CAD: CPT

## 2022-11-18 PROCEDURE — 77063 BREAST TOMOSYNTHESIS BI: CPT

## 2022-11-21 NOTE — MM
Reason for Exam: Screening  (asymptomatic). 

Baseline mammogram.





Patient History: 

Menarche at age 13. First Full-Term Pregnancy at age 39. Late child-bearing (after 32). Patient has

history of breast feeding.

Mother had ovarian cancer, age 37.

Last menstrual period: 10/20/2022





Risk Values: 

Lizz 5 year model risk: 0.8%.

NCI Lifetime model risk: 14.8%.





Prior Study Comparison: 

Patient's first Mammogram. No prior studies available for comparison. 





Tissue Density: 

The breast tissue is heterogeneously dense. This may lower the sensitivity of mammography.





Findings: 

Analyzed By CAD. 

There is no suspicious group of microcalcifications or new suspicious mass in either breast. 





Overall Assessment: Negative, BI-RAD 1





Management: 

Screening Mammogram of both breasts in 1 year.

A clinical breast exam by your physician is recommended on an annual basis and results should be

correlated with mammographic findings.



Electronically signed and approved by: Leopold M. Fregoli, M.D. Radiologis

## 2023-10-26 ENCOUNTER — HOSPITAL ENCOUNTER (OUTPATIENT)
Dept: HOSPITAL 47 - RADUSWWP | Age: 43
Discharge: HOME | End: 2023-10-26
Attending: FAMILY MEDICINE
Payer: COMMERCIAL

## 2023-10-26 DIAGNOSIS — Z90.49: ICD-10-CM

## 2023-10-26 DIAGNOSIS — Z98.891: ICD-10-CM

## 2023-10-26 DIAGNOSIS — K76.0: Primary | ICD-10-CM

## 2023-10-26 PROCEDURE — 76856 US EXAM PELVIC COMPLETE: CPT

## 2023-10-26 PROCEDURE — 76700 US EXAM ABDOM COMPLETE: CPT

## 2023-10-26 NOTE — US
EXAMINATION TYPE: US abdomen complete

 

DATE OF EXAM: 10/26/2023

 

COMPARISON: NONE

 

CLINICAL INDICATION: Female, 43 years old with history of R10.30 LOWER ABDOMINAL PAIN, UNSPECIFIED; f
lank pain, cholecystectomy 

 

TECHNIQUE: Multiple sonographic images of the abdomen are obtained.

 

FINDINGS:

 

EXAM MEASUREMENTS:

 

Liver Length:  15.5 cm   

Gallbladder Wall:  Surgically absent   

CBD:  0.4 cm

Spleen:  8.8 cm   

Right Kidney:  9.0 x 4.4 x 4.3 cm 

Left Kidney:  11.5 x 5.8 x 4.2 cm   

 

SONOGRAPHER NOTES: **Technical limitations due to large amount of overlying bowel gas 

 

Pancreas:  Obscured by bowel gas

Liver:  limited evaluation. best visualized intercostally. Increased attenuation  

Gallbladder:  Surgically absent

**Evidence for sonographic Freeman's sign:  no

CBD:  visualized segment appears wnl  

Spleen:  granulomas    

Right Kidney:  no evidence of hydronephrosis   

Left Kidney:  no evidence of hydronephrosis    

Upper IVC:  wnl  

Abd Aorta:  wnl

 

The liver is homogenous with increased attenuation.  The intrahepatic portion of the IVC and proximal
 abdominal aorta are within normal limits..  Common bile duct is unremarkable.  The visualized portio
ns of the pancreas are homogenous.  The spleen is unremarkable.  Kidneys are symmetric and free of hy
dronephrosis.  No renal lesions are seen.

 

IMPRESSION: 

1.  No evidence for obstructive uropathy.

2.  Hepatic steatosis.

## 2023-10-26 NOTE — US
EXAMINATION TYPE: US pelvic complete

 

DATE OF EXAM: 10/26/2023

 

COMPARISON: 2022

 

CLINICAL INDICATION: Female, 43 years old with history of R10.30 LOWER ABDOMINAL PAIN, UNSPECIFIED; P
elvic pain, prior  

 

TECHNIQUE:  Transabdominal (TA).  

 

Date of LMP:  10/06/23

 

EXAM MEASUREMENTS:

 

Uterus:  10.0 x 4.1 x 5.4 cm

Endometrial Stripe: 1.1 cm

Right Ovary:  3.4 x 2.7 x 1.5 cm

Left Ovary:  2.8 x 2.9 x 1.8 cm

 

 

 

1. Uterus:  Anteverted   wnl

2. Endometrium:  anechoic area = 0.8 x 0.5 x 0.6cm

3. Right Ovary:  dominant follicle = 2.0cm

4. Left Ovary:  follicles 

5. Bilateral Adnexa:  wnl

6. Posterior cul-de-sac:  wnl

 

 

 

IMPRESSION:

No acute process. Endometrium is within normal limits for premenopausal patient. Anechoic area within
 the endometrium could represent blood products.